# Patient Record
Sex: FEMALE | Race: BLACK OR AFRICAN AMERICAN | NOT HISPANIC OR LATINO
[De-identification: names, ages, dates, MRNs, and addresses within clinical notes are randomized per-mention and may not be internally consistent; named-entity substitution may affect disease eponyms.]

---

## 2017-01-09 ENCOUNTER — TRANSCRIPTION ENCOUNTER (OUTPATIENT)
Age: 42
End: 2017-01-09

## 2017-01-12 ENCOUNTER — APPOINTMENT (OUTPATIENT)
Dept: PLASTIC SURGERY | Facility: CLINIC | Age: 42
End: 2017-01-12

## 2017-01-12 VITALS
HEART RATE: 58 BPM | SYSTOLIC BLOOD PRESSURE: 112 MMHG | DIASTOLIC BLOOD PRESSURE: 71 MMHG | WEIGHT: 145 LBS | TEMPERATURE: 98.1 F | HEIGHT: 64 IN | BODY MASS INDEX: 24.75 KG/M2

## 2017-01-12 DIAGNOSIS — Z78.9 OTHER SPECIFIED HEALTH STATUS: ICD-10-CM

## 2017-01-12 DIAGNOSIS — Z87.891 PERSONAL HISTORY OF NICOTINE DEPENDENCE: ICD-10-CM

## 2017-01-12 DIAGNOSIS — Z83.2 FAMILY HISTORY OF DISEASES OF THE BLOOD AND BLOOD-FORMING ORGANS AND CERTAIN DISORDERS INVOLVING THE IMMUNE MECHANISM: ICD-10-CM

## 2017-01-12 DIAGNOSIS — F15.90 OTHER STIMULANT USE, UNSPECIFIED, UNCOMPLICATED: ICD-10-CM

## 2017-01-13 RX ORDER — MULTIVIT-MIN/IRON FUM/FOLIC AC 7.5 MG-4
TABLET ORAL
Refills: 0 | Status: ACTIVE | COMMUNITY

## 2017-01-19 ENCOUNTER — APPOINTMENT (OUTPATIENT)
Dept: INTERNAL MEDICINE | Facility: CLINIC | Age: 42
End: 2017-01-19

## 2017-01-19 ENCOUNTER — NON-APPOINTMENT (OUTPATIENT)
Age: 42
End: 2017-01-19

## 2017-01-19 ENCOUNTER — LABORATORY RESULT (OUTPATIENT)
Age: 42
End: 2017-01-19

## 2017-01-19 VITALS
DIASTOLIC BLOOD PRESSURE: 68 MMHG | SYSTOLIC BLOOD PRESSURE: 110 MMHG | BODY MASS INDEX: 22.77 KG/M2 | HEART RATE: 64 BPM | HEIGHT: 64.5 IN | WEIGHT: 135 LBS

## 2017-01-19 DIAGNOSIS — E61.1 IRON DEFICIENCY: ICD-10-CM

## 2017-01-19 DIAGNOSIS — G43.909 MIGRAINE, UNSPECIFIED, NOT INTRACTABLE, W/OUT STATUS MIGRAINOSUS: ICD-10-CM

## 2017-01-19 DIAGNOSIS — Z00.00 ENCOUNTER FOR GENERAL ADULT MEDICAL EXAMINATION W/OUT ABNORMAL FINDINGS: ICD-10-CM

## 2017-01-19 DIAGNOSIS — N92.0 EXCESSIVE AND FREQUENT MENSTRUATION WITH REGULAR CYCLE: ICD-10-CM

## 2017-01-24 PROBLEM — E61.1 IRON DEFICIENCY: Status: ACTIVE | Noted: 2017-01-24

## 2017-01-24 LAB
25(OH)D3 SERPL-MCNC: 17.8 NG/ML
ALBUMIN SERPL ELPH-MCNC: 4.2 G/DL
ALP BLD-CCNC: 37 U/L
ALT SERPL-CCNC: 13 U/L
ANION GAP SERPL CALC-SCNC: 16 MMOL/L
APTT BLD: 32.6 SEC
AST SERPL-CCNC: 15 U/L
BASOPHILS # BLD AUTO: 0 K/UL
BASOPHILS NFR BLD AUTO: 0 %
BILIRUB SERPL-MCNC: <0.2 MG/DL
BILIRUB UR QL STRIP: NEGATIVE
BUN SERPL-MCNC: 15 MG/DL
CALCIUM SERPL-MCNC: 9.4 MG/DL
CHLORIDE SERPL-SCNC: 106 MMOL/L
CHOLEST SERPL-MCNC: 162 MG/DL
CHOLEST/HDLC SERPL: 2.8 RATIO
CLARITY UR: CLEAR
CO2 SERPL-SCNC: 24 MMOL/L
COLLECTION METHOD: NORMAL
CREAT SERPL-MCNC: 0.74 MG/DL
EOSINOPHIL # BLD AUTO: 0.17 K/UL
EOSINOPHIL NFR BLD AUTO: 3.7 %
FERRITIN SERPL-MCNC: 29.9 NG/ML
FOLATE SERPL-MCNC: 8.6 NG/ML
GLUCOSE SERPL-MCNC: 89 MG/DL
GLUCOSE UR-MCNC: NEGATIVE
HBA1C MFR BLD HPLC: 5.1 %
HCG SERPL-MCNC: 1 MIU/ML
HCG UR QL: 0.2 EU/DL
HCT VFR BLD CALC: 31.2 %
HDLC SERPL-MCNC: 58 MG/DL
HGB A MFR BLD: 69.3 %
HGB A2 MFR BLD: 3.6 %
HGB BLD-MCNC: 10.6 G/DL
HGB C MFR BLD: 27.1 %
HGB F MFR BLD: 0 %
HGB FRACT BLD-IMP: NORMAL
HGB S BLD QL SOLY: NEGATIVE
HGB UR QL STRIP.AUTO: NORMAL
INR PPP: 1.02 RATIO
IRON SATN MFR SERPL: 21 %
IRON SERPL-MCNC: 57 UG/DL
KETONES UR-MCNC: NEGATIVE
LDLC SERPL CALC-MCNC: 99 MG/DL
LEUKOCYTE ESTERASE UR QL STRIP: NEGATIVE
LYMPHOCYTES # BLD AUTO: 2.58 K/UL
LYMPHOCYTES NFR BLD AUTO: 55.1 %
MAN DIFF?: NORMAL
MCHC RBC-ENTMCNC: 23.5 PG
MCHC RBC-ENTMCNC: 34 GM/DL
MCV RBC AUTO: 69 FL
MONOCYTES # BLD AUTO: 0.66 K/UL
MONOCYTES NFR BLD AUTO: 14 %
NEUTROPHILS # BLD AUTO: 1.14 K/UL
NEUTROPHILS NFR BLD AUTO: 24.3 %
NITRITE UR QL STRIP: NEGATIVE
PH UR STRIP: 6
PLATELET # BLD AUTO: 178 K/UL
POTASSIUM SERPL-SCNC: 4.3 MMOL/L
PROT SERPL-MCNC: 6.9 G/DL
PROT UR STRIP-MCNC: NEGATIVE
PT BLD: 11.5 SEC
RBC # BLD: 4.52 M/UL
RBC # FLD: 15.3 %
SAVE SPECIMEN: NORMAL
SODIUM SERPL-SCNC: 146 MMOL/L
SP GR UR STRIP: >=1.03
T3FREE SERPL-MCNC: 2.9 PG/ML
T4 SERPL-MCNC: 6.2 UG/DL
TIBC SERPL-MCNC: 272 UG/DL
TRIGL SERPL-MCNC: 25 MG/DL
TSH SERPL-ACNC: 0.43 UIU/ML
UIBC SERPL-MCNC: 215 UG/DL
WBC # FLD AUTO: 4.68 K/UL

## 2017-01-24 RX ORDER — FOLIC ACID 1 MG/1
1 TABLET ORAL DAILY
Qty: 90 | Refills: 3 | Status: ACTIVE | COMMUNITY
Start: 2017-01-19 | End: 1900-01-01

## 2017-01-24 RX ORDER — FERROUS GLUCONATE 324(38)MG
324 (38 FE) TABLET ORAL
Qty: 100 | Refills: 1 | Status: ACTIVE | COMMUNITY
Start: 2017-01-24 | End: 1900-01-01

## 2017-01-26 ENCOUNTER — APPOINTMENT (OUTPATIENT)
Dept: PLASTIC SURGERY | Facility: CLINIC | Age: 42
End: 2017-01-26

## 2017-01-27 ENCOUNTER — OUTPATIENT (OUTPATIENT)
Dept: OUTPATIENT SERVICES | Facility: HOSPITAL | Age: 42
LOS: 1 days | End: 2017-01-27

## 2017-01-27 VITALS
SYSTOLIC BLOOD PRESSURE: 104 MMHG | WEIGHT: 134.92 LBS | TEMPERATURE: 98 F | RESPIRATION RATE: 16 BRPM | HEART RATE: 88 BPM | DIASTOLIC BLOOD PRESSURE: 64 MMHG | HEIGHT: 64.5 IN

## 2017-01-27 DIAGNOSIS — Z01.818 ENCOUNTER FOR OTHER PREPROCEDURAL EXAMINATION: ICD-10-CM

## 2017-01-27 DIAGNOSIS — K00.9 DISORDER OF TOOTH DEVELOPMENT, UNSPECIFIED: Chronic | ICD-10-CM

## 2017-01-27 DIAGNOSIS — Z91.040 LATEX ALLERGY STATUS: ICD-10-CM

## 2017-01-27 DIAGNOSIS — S01.319A LACERATION WITHOUT FOREIGN BODY OF UNSPECIFIED EAR, INITIAL ENCOUNTER: ICD-10-CM

## 2017-01-27 DIAGNOSIS — Q17.9 CONGENITAL MALFORMATION OF EAR, UNSPECIFIED: ICD-10-CM

## 2017-01-27 LAB
BUN SERPL-MCNC: 11 MG/DL — SIGNIFICANT CHANGE UP (ref 7–23)
CALCIUM SERPL-MCNC: 9.3 MG/DL — SIGNIFICANT CHANGE UP (ref 8.4–10.5)
CHLORIDE SERPL-SCNC: 101 MMOL/L — SIGNIFICANT CHANGE UP (ref 98–107)
CO2 SERPL-SCNC: 24 MMOL/L — SIGNIFICANT CHANGE UP (ref 22–31)
CREAT SERPL-MCNC: 0.71 MG/DL — SIGNIFICANT CHANGE UP (ref 0.5–1.3)
GLUCOSE SERPL-MCNC: 79 MG/DL — SIGNIFICANT CHANGE UP (ref 70–99)
HCT VFR BLD CALC: 31.6 % — LOW (ref 34.5–45)
HGB BLD-MCNC: 11 G/DL — LOW (ref 11.5–15.5)
HIV1 AG SER QL: SIGNIFICANT CHANGE UP
HIV1+2 AB SPEC QL: SIGNIFICANT CHANGE UP
MCHC RBC-ENTMCNC: 23.7 PG — LOW (ref 27–34)
MCHC RBC-ENTMCNC: 34.8 % — SIGNIFICANT CHANGE UP (ref 32–36)
MCV RBC AUTO: 68 FL — LOW (ref 80–100)
PLATELET # BLD AUTO: 197 K/UL — SIGNIFICANT CHANGE UP (ref 150–400)
PMV BLD: 11.2 FL — SIGNIFICANT CHANGE UP (ref 7–13)
POTASSIUM SERPL-MCNC: 4.1 MMOL/L — SIGNIFICANT CHANGE UP (ref 3.5–5.3)
POTASSIUM SERPL-SCNC: 4.1 MMOL/L — SIGNIFICANT CHANGE UP (ref 3.5–5.3)
RBC # BLD: 4.65 M/UL — SIGNIFICANT CHANGE UP (ref 3.8–5.2)
RBC # FLD: 15.5 % — HIGH (ref 10.3–14.5)
SODIUM SERPL-SCNC: 137 MMOL/L — SIGNIFICANT CHANGE UP (ref 135–145)
WBC # BLD: 5.52 K/UL — SIGNIFICANT CHANGE UP (ref 3.8–10.5)
WBC # FLD AUTO: 5.52 K/UL — SIGNIFICANT CHANGE UP (ref 3.8–10.5)

## 2017-01-27 NOTE — H&P PST ADULT - NSANTHOSAYNRD_GEN_A_CORE
never tested/No. PETERSON screening performed.  STOP BANG Legend: 0-2 = LOW Risk; 3-4 = INTERMEDIATE Risk; 5-8 = HIGH Risk

## 2017-01-27 NOTE — H&P PST ADULT - HISTORY OF PRESENT ILLNESS
41 yrs old female with h/o torn left ear lobe 41 yrs old female with h/o torn left ear lobe due to wearing heavy ear rings presents for preop eval to have left ear reconstruction with local flap, bilat silicone breast augmentation on 2/1/17. Pt stated that her left ear lobe was torn for many yrs.

## 2017-01-27 NOTE — H&P PST ADULT - NEGATIVE NEUROLOGICAL SYMPTOMS
no headache/no difficulty walking/no transient paralysis/no vertigo/no loss of sensation/no weakness

## 2017-01-27 NOTE — H&P PST ADULT - ASSESSMENT
41 yrs old female with h/o torn left ear lobe due to wearing heavy ear rings presents for preop eval to have left ear reconstruction with local flap, bilat silicone breast augmentation on 2/1/17. Pt stated that her left ear lobe was torn for many yrs.

## 2017-01-27 NOTE — H&P PST ADULT - NEGATIVE ENMT SYMPTOMS
no tinnitus/no recurrent cold sores/no nasal discharge/no ear pain/no abnormal taste sensation/no gum bleeding/no nasal congestion/no sinus symptoms/no throat pain/left ear lobe laceration due to wearing heavy ear rings/no hearing difficulty/no vertigo/no nose bleeds/no dysphagia/no dry mouth/no post-nasal discharge/no nasal obstruction

## 2017-01-27 NOTE — H&P PST ADULT - PROBLEM SELECTOR PLAN 1
scheduled for left ear reconstruction with local flap, bilat silicone breast augmentation on 2/1/17.   labs pending   preop instructions provided to pt

## 2017-02-01 ENCOUNTER — OUTPATIENT (OUTPATIENT)
Dept: OUTPATIENT SERVICES | Facility: HOSPITAL | Age: 42
LOS: 1 days | Discharge: HOME | End: 2017-02-01
Payer: COMMERCIAL

## 2017-02-01 VITALS
RESPIRATION RATE: 15 BRPM | DIASTOLIC BLOOD PRESSURE: 67 MMHG | OXYGEN SATURATION: 100 % | TEMPERATURE: 98 F | SYSTOLIC BLOOD PRESSURE: 128 MMHG | HEART RATE: 78 BPM

## 2017-02-01 VITALS
HEIGHT: 64 IN | HEART RATE: 65 BPM | SYSTOLIC BLOOD PRESSURE: 102 MMHG | DIASTOLIC BLOOD PRESSURE: 49 MMHG | OXYGEN SATURATION: 100 % | RESPIRATION RATE: 16 BRPM | TEMPERATURE: 98 F | WEIGHT: 134.92 LBS

## 2017-02-01 DIAGNOSIS — Q17.9 CONGENITAL MALFORMATION OF EAR, UNSPECIFIED: ICD-10-CM

## 2017-02-01 DIAGNOSIS — K00.9 DISORDER OF TOOTH DEVELOPMENT, UNSPECIFIED: Chronic | ICD-10-CM

## 2017-02-01 PROBLEM — D64.9 ANEMIA, UNSPECIFIED: Chronic | Status: ACTIVE | Noted: 2017-01-27

## 2017-02-01 PROBLEM — S01.319A: Chronic | Status: ACTIVE | Noted: 2017-01-27

## 2017-02-01 PROCEDURE — 19325 BREAST AUGMENTATION W/IMPLT: CPT | Mod: 50

## 2017-02-01 PROCEDURE — 19318 BREAST REDUCTION: CPT | Mod: 50,59

## 2017-02-01 PROCEDURE — 19316 MASTOPEXY: CPT | Mod: 50

## 2017-02-01 PROCEDURE — 14060 TIS TRNFR E/N/E/L 10 SQ CM/<: CPT

## 2017-02-01 PROCEDURE — 69300 REVISE EXTERNAL EAR: CPT

## 2017-02-01 RX ORDER — SODIUM CHLORIDE 9 MG/ML
1000 INJECTION, SOLUTION INTRAVENOUS
Qty: 0 | Refills: 0 | Status: DISCONTINUED | OUTPATIENT
Start: 2017-02-01 | End: 2017-02-02

## 2017-02-01 RX ORDER — FENTANYL CITRATE 50 UG/ML
50 INJECTION INTRAVENOUS
Qty: 0 | Refills: 0 | Status: DISCONTINUED | OUTPATIENT
Start: 2017-02-01 | End: 2017-02-02

## 2017-02-01 RX ORDER — FENTANYL CITRATE 50 UG/ML
25 INJECTION INTRAVENOUS
Qty: 0 | Refills: 0 | Status: DISCONTINUED | OUTPATIENT
Start: 2017-02-01 | End: 2017-02-02

## 2017-02-01 RX ORDER — ONDANSETRON 8 MG/1
4 TABLET, FILM COATED ORAL ONCE
Qty: 0 | Refills: 0 | Status: COMPLETED | OUTPATIENT
Start: 2017-02-01 | End: 2017-02-01

## 2017-02-01 RX ADMIN — FENTANYL CITRATE 50 MICROGRAM(S): 50 INJECTION INTRAVENOUS at 16:07

## 2017-02-01 RX ADMIN — SODIUM CHLORIDE 100 MILLILITER(S): 9 INJECTION, SOLUTION INTRAVENOUS at 16:06

## 2017-02-01 RX ADMIN — FENTANYL CITRATE 50 MICROGRAM(S): 50 INJECTION INTRAVENOUS at 16:05

## 2017-02-01 RX ADMIN — FENTANYL CITRATE 50 MICROGRAM(S): 50 INJECTION INTRAVENOUS at 15:54

## 2017-02-01 RX ADMIN — ONDANSETRON 4 MILLIGRAM(S): 8 TABLET, FILM COATED ORAL at 16:05

## 2017-02-01 RX ADMIN — FENTANYL CITRATE 50 MICROGRAM(S): 50 INJECTION INTRAVENOUS at 16:47

## 2017-02-01 NOTE — ASU DISCHARGE PLAN (ADULT/PEDIATRIC). - MEDICATION SUMMARY - MEDICATIONS TO TAKE
I will START or STAY ON the medications listed below when I get home from the hospital:    Percocet 5/325 325 mg-5 mg oral tablet  -- 1 tab(s) by mouth every 4 hours MDD:6 tabs  -- Caution federal law prohibits the transfer of this drug to any person other  than the person for whom it was prescribed.  May cause drowsiness.  Alcohol may intensify this effect.  Use care when operating dangerous machinery.  This prescription cannot be refilled.  This product contains acetaminophen.  Do not use  with any other product containing acetaminophen to prevent possible liver damage.  Using more of this medication than prescribed may cause serious breathing problems.    -- Indication: For pain    clindamycin 300 mg oral capsule  -- 1 cap(s) by mouth every 6 hours  -- Finish all this medication unless otherwise directed by prescriber.  Medication should be taken with plenty of water.    -- Indication: For antibiotic    multivitamin  -- 1 tab(s) by mouth once a last dose on 2/2/17  -- Indication: For vitamin    folic acid 1 mg oral tablet  -- 1 tab(s) by mouth once a day in am  -- Indication: For vitamin

## 2017-02-01 NOTE — ASU DISCHARGE PLAN (ADULT/PEDIATRIC). - NOTIFY
Fever greater than 101/Persistent Nausea and Vomiting Fever greater than 101/Pain not relieved by Medications/Bleeding that does not stop/Persistent Nausea and Vomiting

## 2017-02-01 NOTE — ASU DISCHARGE PLAN (ADULT/PEDIATRIC). - NURSING INSTRUCTIONS
When taking pain meds - take with food and know it may cause constipation. To prevent constipation increase fluids and fiber in diet. - Do NOT drive while on narcotics. You were given IV Tylenol for pain management.  Please DO NOT take tylenol or PERCOCET for the next 6-8 hours (until 6:30PM ). Please do not exceed 3000mg of tylenol in 24hours.

## 2017-02-02 ENCOUNTER — TRANSCRIPTION ENCOUNTER (OUTPATIENT)
Age: 42
End: 2017-02-02

## 2017-02-07 ENCOUNTER — APPOINTMENT (OUTPATIENT)
Dept: PLASTIC SURGERY | Facility: CLINIC | Age: 42
End: 2017-02-07

## 2017-02-21 ENCOUNTER — APPOINTMENT (OUTPATIENT)
Dept: PLASTIC SURGERY | Facility: CLINIC | Age: 42
End: 2017-02-21

## 2017-02-28 ENCOUNTER — APPOINTMENT (OUTPATIENT)
Dept: PLASTIC SURGERY | Facility: CLINIC | Age: 42
End: 2017-02-28

## 2017-03-01 RX ORDER — NITROFURANTOIN MACROCRYSTALS 100 MG/1
100 CAPSULE ORAL
Qty: 20 | Refills: 0 | Status: ACTIVE | COMMUNITY
Start: 2016-08-31

## 2017-03-14 ENCOUNTER — APPOINTMENT (OUTPATIENT)
Dept: PLASTIC SURGERY | Facility: CLINIC | Age: 42
End: 2017-03-14

## 2017-03-16 ENCOUNTER — APPOINTMENT (OUTPATIENT)
Dept: PLASTIC SURGERY | Facility: CLINIC | Age: 42
End: 2017-03-16

## 2017-04-11 ENCOUNTER — APPOINTMENT (OUTPATIENT)
Dept: PLASTIC SURGERY | Facility: CLINIC | Age: 42
End: 2017-04-11

## 2017-05-09 ENCOUNTER — APPOINTMENT (OUTPATIENT)
Dept: PLASTIC SURGERY | Facility: CLINIC | Age: 42
End: 2017-05-09

## 2017-05-23 ENCOUNTER — APPOINTMENT (OUTPATIENT)
Dept: DERMATOLOGY | Facility: CLINIC | Age: 42
End: 2017-05-23

## 2017-05-23 VITALS — SYSTOLIC BLOOD PRESSURE: 100 MMHG | DIASTOLIC BLOOD PRESSURE: 60 MMHG

## 2017-05-23 DIAGNOSIS — L81.0 POSTINFLAMMATORY HYPERPIGMENTATION: ICD-10-CM

## 2017-05-23 RX ORDER — FLUOCINOLONE ACETONIDE, HYDROQUINONE, AND TRETINOIN .1; 40; .5 MG/G; MG/G; MG/G
0.01-4-0.05 CREAM TOPICAL
Qty: 1 | Refills: 1 | Status: ACTIVE | COMMUNITY
Start: 2017-05-23 | End: 1900-01-01

## 2017-05-25 ENCOUNTER — OUTPATIENT (OUTPATIENT)
Dept: OUTPATIENT SERVICES | Facility: HOSPITAL | Age: 42
LOS: 1 days | End: 2017-05-25
Payer: COMMERCIAL

## 2017-05-25 VITALS
OXYGEN SATURATION: 100 % | HEIGHT: 64 IN | SYSTOLIC BLOOD PRESSURE: 106 MMHG | HEART RATE: 70 BPM | WEIGHT: 145.06 LBS | DIASTOLIC BLOOD PRESSURE: 69 MMHG | TEMPERATURE: 98 F

## 2017-05-25 DIAGNOSIS — Z98.82 BREAST IMPLANT STATUS: Chronic | ICD-10-CM

## 2017-05-25 DIAGNOSIS — Z01.818 ENCOUNTER FOR OTHER PREPROCEDURAL EXAMINATION: ICD-10-CM

## 2017-05-25 DIAGNOSIS — N65.1 DISPROPORTION OF RECONSTRUCTED BREAST: ICD-10-CM

## 2017-05-25 DIAGNOSIS — L91.0 HYPERTROPHIC SCAR: ICD-10-CM

## 2017-05-25 DIAGNOSIS — Z98.51 TUBAL LIGATION STATUS: Chronic | ICD-10-CM

## 2017-05-25 DIAGNOSIS — K00.9 DISORDER OF TOOTH DEVELOPMENT, UNSPECIFIED: Chronic | ICD-10-CM

## 2017-05-25 LAB
HCT VFR BLD CALC: 32.3 % — LOW (ref 34.5–45)
HGB BLD-MCNC: 11 G/DL — LOW (ref 11.5–15.5)
INR BLD: 0.89 RATIO — SIGNIFICANT CHANGE UP (ref 0.88–1.16)
MCHC RBC-ENTMCNC: 23 PG — LOW (ref 27–34)
MCHC RBC-ENTMCNC: 34.1 GM/DL — SIGNIFICANT CHANGE UP (ref 32–36)
MCV RBC AUTO: 67.4 FL — LOW (ref 80–100)
PLATELET # BLD AUTO: 220 K/UL — SIGNIFICANT CHANGE UP (ref 150–400)
PROTHROM AB SERPL-ACNC: 10 SEC — SIGNIFICANT CHANGE UP (ref 10–13.1)
RBC # BLD: 4.79 M/UL — SIGNIFICANT CHANGE UP (ref 3.8–5.2)
RBC # FLD: 15.8 % — HIGH (ref 10.3–14.5)
WBC # BLD: 4.97 K/UL — SIGNIFICANT CHANGE UP (ref 3.8–10.5)
WBC # FLD AUTO: 4.97 K/UL — SIGNIFICANT CHANGE UP (ref 3.8–10.5)

## 2017-05-25 PROCEDURE — 85027 COMPLETE CBC AUTOMATED: CPT

## 2017-05-25 PROCEDURE — 85610 PROTHROMBIN TIME: CPT

## 2017-05-25 RX ORDER — CELECOXIB 200 MG/1
200 CAPSULE ORAL ONCE
Qty: 0 | Refills: 0 | Status: COMPLETED | OUTPATIENT
Start: 2017-05-31 | End: 2017-05-31

## 2017-05-25 RX ORDER — SODIUM CHLORIDE 9 MG/ML
3 INJECTION INTRAMUSCULAR; INTRAVENOUS; SUBCUTANEOUS EVERY 8 HOURS
Qty: 0 | Refills: 0 | Status: DISCONTINUED | OUTPATIENT
Start: 2017-05-31 | End: 2017-06-15

## 2017-05-25 RX ORDER — ACETAMINOPHEN 500 MG
975 TABLET ORAL ONCE
Qty: 0 | Refills: 0 | Status: COMPLETED | OUTPATIENT
Start: 2017-05-31 | End: 2017-05-31

## 2017-05-25 RX ORDER — LIDOCAINE HCL 20 MG/ML
0.2 VIAL (ML) INJECTION ONCE
Qty: 0 | Refills: 0 | Status: DISCONTINUED | OUTPATIENT
Start: 2017-05-31 | End: 2017-06-15

## 2017-05-25 RX ORDER — APREPITANT 80 MG/1
40 CAPSULE ORAL ONCE
Qty: 0 | Refills: 0 | Status: COMPLETED | OUTPATIENT
Start: 2017-05-31 | End: 2017-05-31

## 2017-05-25 NOTE — H&P PST ADULT - NSANTHOSAYNRD_GEN_A_CORE
No. PETERSON screening performed.  STOP BANG Legend: 0-2 = LOW Risk; 3-4 = INTERMEDIATE Risk; 5-8 = HIGH Risk

## 2017-05-25 NOTE — H&P PST ADULT - VISION (WITH CORRECTIVE LENSES IF THE PATIENT USUALLY WEARS THEM):
Pt wears glasses/Normal vision: sees adequately in most situations; can see medication labels, newsprint

## 2017-05-25 NOTE — H&P PST ADULT - HISTORY OF PRESENT ILLNESS
Pt. is a 41 y.o. BF who presents s/p Bilateral Breast Augmentation in January 2017. Pt. states implants are assymetrical and " the left one didn't drop." Pt is scheduled for left breast revision on 5/31/17 with Dr. Bernal.

## 2017-05-25 NOTE — H&P PST ADULT - PMH
Anemia    Breast asymmetry following reconstructive surgery    Ear lobe laceration  left  Hemoglobin C trait  Pt states she is asymptomatic and treats deficiency with daily folic acid

## 2017-05-25 NOTE — H&P PST ADULT - PSH
Dental anomaly  all four wisdom teeth removed 1996  Status post breast augmentation    Status post tubal ligation

## 2017-05-31 ENCOUNTER — OUTPATIENT (OUTPATIENT)
Dept: OUTPATIENT SERVICES | Facility: HOSPITAL | Age: 42
LOS: 1 days | End: 2017-05-31
Payer: COMMERCIAL

## 2017-05-31 VITALS
TEMPERATURE: 98 F | RESPIRATION RATE: 18 BRPM | WEIGHT: 145.06 LBS | SYSTOLIC BLOOD PRESSURE: 89 MMHG | OXYGEN SATURATION: 100 % | DIASTOLIC BLOOD PRESSURE: 60 MMHG | HEIGHT: 64 IN | HEART RATE: 68 BPM

## 2017-05-31 VITALS
RESPIRATION RATE: 18 BRPM | OXYGEN SATURATION: 100 % | DIASTOLIC BLOOD PRESSURE: 72 MMHG | HEART RATE: 71 BPM | TEMPERATURE: 99 F | SYSTOLIC BLOOD PRESSURE: 110 MMHG

## 2017-05-31 DIAGNOSIS — Z98.82 BREAST IMPLANT STATUS: Chronic | ICD-10-CM

## 2017-05-31 DIAGNOSIS — K00.9 DISORDER OF TOOTH DEVELOPMENT, UNSPECIFIED: Chronic | ICD-10-CM

## 2017-05-31 DIAGNOSIS — L91.0 HYPERTROPHIC SCAR: ICD-10-CM

## 2017-05-31 DIAGNOSIS — Z98.51 TUBAL LIGATION STATUS: Chronic | ICD-10-CM

## 2017-05-31 PROCEDURE — 19380 REVJ RECONSTRUCTED BREAST: CPT | Mod: LT

## 2017-05-31 PROCEDURE — 13101 CMPLX RPR TRUNK 2.6-7.5 CM: CPT

## 2017-05-31 RX ORDER — FOLIC ACID 0.8 MG
1 TABLET ORAL
Qty: 0 | Refills: 0 | COMMUNITY

## 2017-05-31 RX ORDER — ONDANSETRON 8 MG/1
4 TABLET, FILM COATED ORAL ONCE
Qty: 0 | Refills: 0 | Status: COMPLETED | OUTPATIENT
Start: 2017-05-31 | End: 2017-05-31

## 2017-05-31 RX ORDER — SODIUM CHLORIDE 9 MG/ML
1000 INJECTION, SOLUTION INTRAVENOUS
Qty: 0 | Refills: 0 | Status: DISCONTINUED | OUTPATIENT
Start: 2017-05-31 | End: 2017-06-15

## 2017-05-31 RX ORDER — OXYCODONE HYDROCHLORIDE 5 MG/1
5 TABLET ORAL ONCE
Qty: 0 | Refills: 0 | Status: DISCONTINUED | OUTPATIENT
Start: 2017-05-31 | End: 2017-05-31

## 2017-05-31 RX ORDER — CELECOXIB 200 MG/1
200 CAPSULE ORAL ONCE
Qty: 0 | Refills: 0 | Status: DISCONTINUED | OUTPATIENT
Start: 2017-05-31 | End: 2017-06-15

## 2017-05-31 RX ORDER — NITROFURANTOIN MACROCRYSTAL 50 MG
1 CAPSULE ORAL
Qty: 0 | Refills: 0 | COMMUNITY

## 2017-05-31 RX ORDER — FAMOTIDINE 10 MG/ML
0 INJECTION INTRAVENOUS
Qty: 0 | Refills: 0 | COMMUNITY

## 2017-05-31 RX ADMIN — APREPITANT 40 MILLIGRAM(S): 80 CAPSULE ORAL at 13:22

## 2017-05-31 RX ADMIN — CELECOXIB 200 MILLIGRAM(S): 200 CAPSULE ORAL at 13:21

## 2017-05-31 RX ADMIN — Medication 975 MILLIGRAM(S): at 13:21

## 2017-05-31 RX ADMIN — ONDANSETRON 4 MILLIGRAM(S): 8 TABLET, FILM COATED ORAL at 17:36

## 2017-05-31 RX ADMIN — OXYCODONE HYDROCHLORIDE 5 MILLIGRAM(S): 5 TABLET ORAL at 17:35

## 2017-05-31 NOTE — ASU PREOP CHECKLIST - PATIENT'S PERSONAL PROPERTY REMOVED
piercing posterior neck patient unable to remove will ask surgeon to remove piercing posterior neck patient unable to remove will ask surgeon to remove per patient

## 2017-05-31 NOTE — ASU PATIENT PROFILE, ADULT - VISION (WITH CORRECTIVE LENSES IF THE PATIENT USUALLY WEARS THEM):
Normal vision: sees adequately in most situations; can see medication labels, newsprint/Pt wears glasses

## 2017-05-31 NOTE — ASU DISCHARGE PLAN (ADULT/PEDIATRIC). - NOTIFY
Inability to Tolerate Liquids or Foods/Persistent Nausea and Vomiting/Pain not relieved by Medications/Fever greater than 101 Pain not relieved by Medications/Swelling that continues/Fever greater than 101/Inability to Tolerate Liquids or Foods/Unable to Urinate/Persistent Nausea and Vomiting

## 2017-05-31 NOTE — ASU DISCHARGE PLAN (ADULT/PEDIATRIC). - FOLLOWUP APPOINTMENT CLINIC/PHYSICIAN
Please follow up with Dr. Bernal within x1 week after discharge from the hospital. You may call [phone] to schedule an appointment.

## 2017-05-31 NOTE — ASU DISCHARGE PLAN (ADULT/PEDIATRIC). - DRIVING
No/Please do not drive until your pain is well controlled, and you do not need to take pain medications.

## 2017-05-31 NOTE — BRIEF OPERATIVE NOTE - OPERATION/FINDINGS
Preoperative Diagnosis: h/o bilateral breast reconstruction; breast asymmetry  Procedure: left breast revision of reconstruction  Postoperative Diagnosis: h/o bilateral breast reconstruction; breast asymmetry

## 2017-06-01 ENCOUNTER — TRANSCRIPTION ENCOUNTER (OUTPATIENT)
Age: 42
End: 2017-06-01

## 2017-06-05 RX ORDER — SULFAMETHOXAZOLE AND TRIMETHOPRIM 800; 160 MG/1; MG/1
800-160 TABLET ORAL TWICE DAILY
Qty: 10 | Refills: 0 | Status: ACTIVE | COMMUNITY
Start: 2017-06-05 | End: 1900-01-01

## 2017-06-06 ENCOUNTER — APPOINTMENT (OUTPATIENT)
Dept: PLASTIC SURGERY | Facility: CLINIC | Age: 42
End: 2017-06-06

## 2017-06-06 DIAGNOSIS — Z41.1 ENCOUNTER FOR COSMETIC SURGERY: ICD-10-CM

## 2017-06-20 ENCOUNTER — APPOINTMENT (OUTPATIENT)
Dept: PLASTIC SURGERY | Facility: CLINIC | Age: 42
End: 2017-06-20

## 2017-06-20 DIAGNOSIS — H61.119 ACQUIRED DEFORMITY OF PINNA, UNSPECIFIED EAR: ICD-10-CM

## 2017-06-28 PROBLEM — H61.119 ACQUIRED EAR DEFORMITY: Status: ACTIVE | Noted: 2017-01-13

## 2017-09-05 ENCOUNTER — APPOINTMENT (OUTPATIENT)
Dept: PLASTIC SURGERY | Facility: CLINIC | Age: 42
End: 2017-09-05
Payer: COMMERCIAL

## 2017-09-05 DIAGNOSIS — N63 UNSPECIFIED LUMP IN BREAST: ICD-10-CM

## 2017-09-05 PROCEDURE — 99213 OFFICE O/P EST LOW 20 MIN: CPT

## 2017-09-05 RX ORDER — DAPSONE 50 MG/G
5 GEL TOPICAL
Qty: 60 | Refills: 0 | Status: ACTIVE | COMMUNITY
Start: 2017-04-12

## 2017-09-05 RX ORDER — OXYCODONE AND ACETAMINOPHEN 5; 325 MG/1; MG/1
5-325 TABLET ORAL
Qty: 30 | Refills: 0 | Status: COMPLETED | COMMUNITY
Start: 2017-05-31

## 2018-05-15 ENCOUNTER — APPOINTMENT (OUTPATIENT)
Dept: PLASTIC SURGERY | Facility: CLINIC | Age: 43
End: 2018-05-15
Payer: COMMERCIAL

## 2018-05-15 DIAGNOSIS — L91.0 HYPERTROPHIC SCAR: ICD-10-CM

## 2018-05-15 PROCEDURE — 99213 OFFICE O/P EST LOW 20 MIN: CPT

## 2018-05-15 PROCEDURE — 99199 UNLISTED SPECIAL SVC PX/RPRT: CPT | Mod: NC

## 2018-07-16 PROBLEM — D58.2 OTHER HEMOGLOBINOPATHIES: Chronic | Status: ACTIVE | Noted: 2017-05-25

## 2018-07-26 NOTE — H&P PST ADULT - VASCULAR
[Patient Intake Form Reviewed] : Patient intake form was reviewed [Frequency] : frequency [Dizziness] : dizziness [Negative] : Integumentary [Suicidal] : not suicidal [Easy Bleeding] : no easy bleeding [de-identified] : vertigo Equal and normal pulses (carotid, femoral, dorsalis pedis)

## 2018-10-09 ENCOUNTER — RESULT REVIEW (OUTPATIENT)
Age: 43
End: 2018-10-09

## 2018-11-13 ENCOUNTER — APPOINTMENT (OUTPATIENT)
Dept: PLASTIC SURGERY | Facility: CLINIC | Age: 43
End: 2018-11-13

## 2019-01-02 ENCOUNTER — TRANSCRIPTION ENCOUNTER (OUTPATIENT)
Age: 44
End: 2019-01-02

## 2019-03-08 NOTE — H&P PST ADULT - BLOOD TRANSFUSION, PREVIOUS, PROFILE
Ms. Jeannie Blank called stating she was having vaginal discharge/bleeding. She stated her ovaries were causing her pain too. I made an appointment for Tuesday with Dr. Chidi Watson. (She prefers a female physician). After consulting with Dr. Pushpa Virgen about the bleeding, he recommended she see someone as soon as possible and not wait until next week. Ms. Jeannie Blank is going to an urgent care to get checked out & will follow back up. no

## 2019-05-24 ENCOUNTER — TRANSCRIPTION ENCOUNTER (OUTPATIENT)
Age: 44
End: 2019-05-24

## 2021-12-13 ENCOUNTER — OUTPATIENT (OUTPATIENT)
Dept: OUTPATIENT SERVICES | Facility: HOSPITAL | Age: 46
LOS: 1 days | End: 2021-12-13
Payer: COMMERCIAL

## 2021-12-13 VITALS
WEIGHT: 141.98 LBS | HEART RATE: 71 BPM | TEMPERATURE: 99 F | DIASTOLIC BLOOD PRESSURE: 76 MMHG | OXYGEN SATURATION: 99 % | RESPIRATION RATE: 18 BRPM | SYSTOLIC BLOOD PRESSURE: 111 MMHG | HEIGHT: 64 IN

## 2021-12-13 DIAGNOSIS — Z98.51 TUBAL LIGATION STATUS: Chronic | ICD-10-CM

## 2021-12-13 DIAGNOSIS — M47.816 SPONDYLOSIS WITHOUT MYELOPATHY OR RADICULOPATHY, LUMBAR REGION: ICD-10-CM

## 2021-12-13 DIAGNOSIS — K00.9 DISORDER OF TOOTH DEVELOPMENT, UNSPECIFIED: Chronic | ICD-10-CM

## 2021-12-13 DIAGNOSIS — Z29.9 ENCOUNTER FOR PROPHYLACTIC MEASURES, UNSPECIFIED: ICD-10-CM

## 2021-12-13 DIAGNOSIS — Z98.82 BREAST IMPLANT STATUS: Chronic | ICD-10-CM

## 2021-12-13 PROBLEM — Z00.00 ENCOUNTER FOR PREVENTIVE HEALTH EXAMINATION: Status: ACTIVE | Noted: 2021-12-13

## 2021-12-13 LAB
ANION GAP SERPL CALC-SCNC: 11 MMOL/L — SIGNIFICANT CHANGE UP (ref 5–17)
BLD GP AB SCN SERPL QL: NEGATIVE — SIGNIFICANT CHANGE UP
BUN SERPL-MCNC: 19 MG/DL — SIGNIFICANT CHANGE UP (ref 7–23)
CALCIUM SERPL-MCNC: 9.5 MG/DL — SIGNIFICANT CHANGE UP (ref 8.4–10.5)
CHLORIDE SERPL-SCNC: 104 MMOL/L — SIGNIFICANT CHANGE UP (ref 96–108)
CO2 SERPL-SCNC: 26 MMOL/L — SIGNIFICANT CHANGE UP (ref 22–31)
CREAT SERPL-MCNC: 0.66 MG/DL — SIGNIFICANT CHANGE UP (ref 0.5–1.3)
GLUCOSE SERPL-MCNC: 111 MG/DL — HIGH (ref 70–99)
HCT VFR BLD CALC: 34 % — LOW (ref 34.5–45)
HGB BLD-MCNC: 11.5 G/DL — SIGNIFICANT CHANGE UP (ref 11.5–15.5)
MCHC RBC-ENTMCNC: 23.3 PG — LOW (ref 27–34)
MCHC RBC-ENTMCNC: 33.8 GM/DL — SIGNIFICANT CHANGE UP (ref 32–36)
MCV RBC AUTO: 69 FL — LOW (ref 80–100)
MRSA PCR RESULT.: SIGNIFICANT CHANGE UP
NRBC # BLD: 0 /100 WBCS — SIGNIFICANT CHANGE UP (ref 0–0)
PLATELET # BLD AUTO: 203 K/UL — SIGNIFICANT CHANGE UP (ref 150–400)
POTASSIUM SERPL-MCNC: 5.3 MMOL/L — SIGNIFICANT CHANGE UP (ref 3.5–5.3)
POTASSIUM SERPL-SCNC: 5.3 MMOL/L — SIGNIFICANT CHANGE UP (ref 3.5–5.3)
RBC # BLD: 4.93 M/UL — SIGNIFICANT CHANGE UP (ref 3.8–5.2)
RBC # FLD: 15.1 % — HIGH (ref 10.3–14.5)
RH IG SCN BLD-IMP: POSITIVE — SIGNIFICANT CHANGE UP
S AUREUS DNA NOSE QL NAA+PROBE: SIGNIFICANT CHANGE UP
SODIUM SERPL-SCNC: 141 MMOL/L — SIGNIFICANT CHANGE UP (ref 135–145)
WBC # BLD: 4.68 K/UL — SIGNIFICANT CHANGE UP (ref 3.8–10.5)
WBC # FLD AUTO: 4.68 K/UL — SIGNIFICANT CHANGE UP (ref 3.8–10.5)

## 2021-12-13 RX ORDER — VANCOMYCIN HCL 1 G
1000 VIAL (EA) INTRAVENOUS ONCE
Refills: 0 | Status: DISCONTINUED | OUTPATIENT
Start: 2022-01-10 | End: 2022-01-11

## 2021-12-13 RX ORDER — LIDOCAINE HCL 20 MG/ML
0.2 VIAL (ML) INJECTION ONCE
Refills: 0 | Status: DISCONTINUED | OUTPATIENT
Start: 2022-01-10 | End: 2022-01-10

## 2021-12-13 RX ORDER — SODIUM CHLORIDE 9 MG/ML
3 INJECTION INTRAMUSCULAR; INTRAVENOUS; SUBCUTANEOUS EVERY 8 HOURS
Refills: 0 | Status: DISCONTINUED | OUTPATIENT
Start: 2022-01-10 | End: 2022-01-10

## 2021-12-13 RX ORDER — CHLORHEXIDINE GLUCONATE 213 G/1000ML
1 SOLUTION TOPICAL ONCE
Refills: 0 | Status: DISCONTINUED | OUTPATIENT
Start: 2022-01-10 | End: 2022-01-10

## 2021-12-13 NOTE — H&P PST ADULT - PROBLEM SELECTOR PLAN 1
-Left L4- L5  Facectomy, Decompression Laminectomy  l4-  5 ,l5 s1 lumbar posterior interbody Fusion on 1/10/22.  -Pre- Op Instructions Discussed   -labs sent   - covid test 1/7/21

## 2021-12-13 NOTE — H&P PST ADULT - HISTORY OF PRESENT ILLNESS
47 y/o female with chronic back pain  s/p multiple conservative management with no good improved followed by neurosurgery. Presents to PST for scheduled Left L4- L5   Facectomy, Decompression Laminectomy  l4-  5 ,l5 s1 lumbar posterior interbody Fusion on 1/10/22.    Covid test 1/7/21

## 2021-12-13 NOTE — H&P PST ADULT - NSICDXPASTSURGICALHX_GEN_ALL_CORE_FT
PAST SURGICAL HISTORY:  H/O breast implant 2017    H/O tubal ligation 2004    Normal vaginal delivery x2

## 2021-12-13 NOTE — H&P PST ADULT - PAIN, CHRONIC: FACTORS THAT AGGRAVATE, PROFILE
O-L Flap Text: The defect edges were debeveled with a #15 scalpel blade.  Given the location of the defect, shape of the defect and the proximity to free margins an O-L flap was deemed most appropriate.  Using a sterile surgical marker, an appropriate advancement flap was drawn incorporating the defect and placing the expected incisions within the relaxed skin tension lines where possible.    The area thus outlined was incised deep to adipose tissue with a #15 scalpel blade.  The skin margins were undermined to an appropriate distance in all directions utilizing iris scissors. movement

## 2021-12-13 NOTE — H&P PST ADULT - ASSESSMENT
CAPRINI SCORE [CLOT updated 18]    AGE RELATED RISK FACTORS                                                       MOBILITY RELATED FACTORS  [x ] Age 41-60 years                                            (1 Point)                    [ ] Bed rest                                                        (1 Point)  [ ] Age: 61-74 years                                           (2 Points)                  [ ] Plaster cast                                                   (2 Points)  [ ] Age= 75 years                                              (3 Points)                    [ ] Bed bound for more than 72 hours                 (2 Points)    DISEASE RELATED RISK FACTORS                                               GENDER SPECIFIC FACTORS  [ ] Edema in the lower extremities                       (1 Point)              [ ] Pregnancy                                                     (1 Point)  [ ] Varicose veins                                               (1 Point)                     [ ] Post-partum < 6 weeks                                   (1 Point)             [x ] BMI > 25 Kg/m2                                            (1 Point)                     [ ] Hormonal therapy  or oral contraception          (1 Point)                 [ ] Sepsis (in the previous month)                        (1 Point)               [ ] History of pregnancy complications                 (1 point)  [ ] Pneumonia or serious lung disease                                               [ ] Unexplained or recurrent                     (1 Point)           (in the previous month)                               (1 Point)  [ ] Abnormal pulmonary function test                     (1 Point)                 SURGERY RELATED RISK FACTORS  [ ] Acute myocardial infarction                              (1 Point)               [ ]  Section                                             (1 Point)  [ ] Congestive heart failure (in the previous month)  (1 Point)      [ ] Minor surgery                                                  (1 Point)   [ ] Inflammatory bowel disease                             (1 Point)               [ ] Arthroscopic surgery                                        (2 Points)  [ ] Central venous access                                      (2 Points)                [ x] General surgery lasting more than 45 minutes (2 points)  [ ] Present or previous malignancy                     (2 Points)                [ ] Elective arthroplasty                                         (5 points)    [ ] Stroke (in the previous month)                          (5 Points)                                                                                                                                                           HEMATOLOGY RELATED FACTORS                                                 TRAUMA RELATED RISK FACTORS  [ ] Prior episodes of VTE                                     (3 Points)                [ ] Fracture of the hip, pelvis, or leg                       (5 Points)  [ ] Positive family history for VTE                         (3 Points)             [ ] Acute spinal cord injury (in the previous month)  (5 Points)  [ ] Prothrombin 66488 A                                     (3 Points)               [ ] Paralysis  (less than 1 month)                             (5 Points)  [ ] Factor V Leiden                                             (3 Points)                  [ ] Multiple Trauma within 1 month                        (5 Points)  [ ] Lupus anticoagulants                                     (3 Points)                                                           [ ] Anticardiolipin antibodies                               (3 Points)                                                       [ ] High homocysteine in the blood                      (3 Points)                                             [ ] Other congenital or acquired thrombophilia      (3 Points)                                                [ ] Heparin induced thrombocytopenia                  (3 Points)                                     Total Score [    4      ]

## 2022-01-07 ENCOUNTER — OUTPATIENT (OUTPATIENT)
Dept: OUTPATIENT SERVICES | Facility: HOSPITAL | Age: 47
LOS: 1 days | End: 2022-01-07
Payer: COMMERCIAL

## 2022-01-07 DIAGNOSIS — Z98.82 BREAST IMPLANT STATUS: Chronic | ICD-10-CM

## 2022-01-07 DIAGNOSIS — Z11.52 ENCOUNTER FOR SCREENING FOR COVID-19: ICD-10-CM

## 2022-01-07 DIAGNOSIS — Z98.51 TUBAL LIGATION STATUS: Chronic | ICD-10-CM

## 2022-01-07 LAB — SARS-COV-2 RNA SPEC QL NAA+PROBE: SIGNIFICANT CHANGE UP

## 2022-01-09 ENCOUNTER — TRANSCRIPTION ENCOUNTER (OUTPATIENT)
Age: 47
End: 2022-01-09

## 2022-01-10 ENCOUNTER — INPATIENT (INPATIENT)
Facility: HOSPITAL | Age: 47
LOS: 2 days | Discharge: HOME | DRG: 454 | End: 2022-01-13
Attending: NEUROLOGICAL SURGERY | Admitting: NEUROLOGICAL SURGERY
Payer: COMMERCIAL

## 2022-01-10 VITALS
RESPIRATION RATE: 18 BRPM | SYSTOLIC BLOOD PRESSURE: 123 MMHG | WEIGHT: 141.98 LBS | TEMPERATURE: 98 F | DIASTOLIC BLOOD PRESSURE: 77 MMHG | HEART RATE: 85 BPM | HEIGHT: 64 IN | OXYGEN SATURATION: 95 %

## 2022-01-10 DIAGNOSIS — M47.816 SPONDYLOSIS WITHOUT MYELOPATHY OR RADICULOPATHY, LUMBAR REGION: ICD-10-CM

## 2022-01-10 DIAGNOSIS — Z98.51 TUBAL LIGATION STATUS: Chronic | ICD-10-CM

## 2022-01-10 DIAGNOSIS — Z98.82 BREAST IMPLANT STATUS: Chronic | ICD-10-CM

## 2022-01-10 LAB — HCG UR QL: NEGATIVE — SIGNIFICANT CHANGE UP

## 2022-01-10 RX ORDER — HYDROMORPHONE HYDROCHLORIDE 2 MG/ML
0.5 INJECTION INTRAMUSCULAR; INTRAVENOUS; SUBCUTANEOUS
Refills: 0 | Status: DISCONTINUED | OUTPATIENT
Start: 2022-01-10 | End: 2022-01-10

## 2022-01-10 RX ORDER — ACETAMINOPHEN 500 MG
650 TABLET ORAL EVERY 6 HOURS
Refills: 0 | Status: DISCONTINUED | OUTPATIENT
Start: 2022-01-10 | End: 2022-01-13

## 2022-01-10 RX ORDER — OXYCODONE HYDROCHLORIDE 5 MG/1
10 TABLET ORAL EVERY 4 HOURS
Refills: 0 | Status: DISCONTINUED | OUTPATIENT
Start: 2022-01-10 | End: 2022-01-12

## 2022-01-10 RX ORDER — INFLUENZA VIRUS VACCINE 15; 15; 15; 15 UG/.5ML; UG/.5ML; UG/.5ML; UG/.5ML
0.5 SUSPENSION INTRAMUSCULAR ONCE
Refills: 0 | Status: DISCONTINUED | OUTPATIENT
Start: 2022-01-10 | End: 2022-01-13

## 2022-01-10 RX ORDER — TRANEXAMIC ACID 100 MG/ML
650 INJECTION, SOLUTION INTRAVENOUS EVERY 6 HOURS
Refills: 0 | Status: COMPLETED | OUTPATIENT
Start: 2022-01-10 | End: 2022-01-11

## 2022-01-10 RX ORDER — SODIUM CHLORIDE 9 MG/ML
1000 INJECTION INTRAMUSCULAR; INTRAVENOUS; SUBCUTANEOUS
Refills: 0 | Status: DISCONTINUED | OUTPATIENT
Start: 2022-01-10 | End: 2022-01-12

## 2022-01-10 RX ORDER — OXYCODONE HYDROCHLORIDE 5 MG/1
5 TABLET ORAL EVERY 4 HOURS
Refills: 0 | Status: DISCONTINUED | OUTPATIENT
Start: 2022-01-10 | End: 2022-01-12

## 2022-01-10 RX ORDER — SENNA PLUS 8.6 MG/1
2 TABLET ORAL AT BEDTIME
Refills: 0 | Status: DISCONTINUED | OUTPATIENT
Start: 2022-01-10 | End: 2022-01-13

## 2022-01-10 RX ORDER — HYDROMORPHONE HYDROCHLORIDE 2 MG/ML
1 INJECTION INTRAMUSCULAR; INTRAVENOUS; SUBCUTANEOUS
Refills: 0 | Status: DISCONTINUED | OUTPATIENT
Start: 2022-01-10 | End: 2022-01-10

## 2022-01-10 RX ORDER — HYDROMORPHONE HYDROCHLORIDE 2 MG/ML
0.5 INJECTION INTRAMUSCULAR; INTRAVENOUS; SUBCUTANEOUS EVERY 4 HOURS
Refills: 0 | Status: DISCONTINUED | OUTPATIENT
Start: 2022-01-10 | End: 2022-01-11

## 2022-01-10 RX ORDER — CEFAZOLIN SODIUM 1 G
2000 VIAL (EA) INJECTION EVERY 8 HOURS
Refills: 0 | Status: COMPLETED | OUTPATIENT
Start: 2022-01-10 | End: 2022-01-11

## 2022-01-10 RX ORDER — ENOXAPARIN SODIUM 100 MG/ML
40 INJECTION SUBCUTANEOUS AT BEDTIME
Refills: 0 | Status: DISCONTINUED | OUTPATIENT
Start: 2022-01-11 | End: 2022-01-13

## 2022-01-10 RX ORDER — DIAZEPAM 5 MG
5 TABLET ORAL EVERY 8 HOURS
Refills: 0 | Status: DISCONTINUED | OUTPATIENT
Start: 2022-01-10 | End: 2022-01-11

## 2022-01-10 RX ORDER — ONDANSETRON 8 MG/1
4 TABLET, FILM COATED ORAL EVERY 6 HOURS
Refills: 0 | Status: DISCONTINUED | OUTPATIENT
Start: 2022-01-10 | End: 2022-01-10

## 2022-01-10 RX ADMIN — TRANEXAMIC ACID 213 MILLIGRAM(S): 100 INJECTION, SOLUTION INTRAVENOUS at 17:54

## 2022-01-10 RX ADMIN — HYDROMORPHONE HYDROCHLORIDE 0.5 MILLIGRAM(S): 2 INJECTION INTRAMUSCULAR; INTRAVENOUS; SUBCUTANEOUS at 20:54

## 2022-01-10 RX ADMIN — HYDROMORPHONE HYDROCHLORIDE 1 MILLIGRAM(S): 2 INJECTION INTRAMUSCULAR; INTRAVENOUS; SUBCUTANEOUS at 14:50

## 2022-01-10 RX ADMIN — SODIUM CHLORIDE 75 MILLILITER(S): 9 INJECTION INTRAMUSCULAR; INTRAVENOUS; SUBCUTANEOUS at 13:47

## 2022-01-10 RX ADMIN — SENNA PLUS 2 TABLET(S): 8.6 TABLET ORAL at 21:00

## 2022-01-10 RX ADMIN — SODIUM CHLORIDE 75 MILLILITER(S): 9 INJECTION INTRAMUSCULAR; INTRAVENOUS; SUBCUTANEOUS at 20:58

## 2022-01-10 RX ADMIN — HYDROMORPHONE HYDROCHLORIDE 1 MILLIGRAM(S): 2 INJECTION INTRAMUSCULAR; INTRAVENOUS; SUBCUTANEOUS at 14:00

## 2022-01-10 RX ADMIN — TRANEXAMIC ACID 213 MILLIGRAM(S): 100 INJECTION, SOLUTION INTRAVENOUS at 23:30

## 2022-01-10 RX ADMIN — Medication 100 MILLIGRAM(S): at 20:21

## 2022-01-10 RX ADMIN — HYDROMORPHONE HYDROCHLORIDE 1 MILLIGRAM(S): 2 INJECTION INTRAMUSCULAR; INTRAVENOUS; SUBCUTANEOUS at 14:35

## 2022-01-10 RX ADMIN — Medication 5 MILLIGRAM(S): at 18:08

## 2022-01-10 RX ADMIN — HYDROMORPHONE HYDROCHLORIDE 0.5 MILLIGRAM(S): 2 INJECTION INTRAMUSCULAR; INTRAVENOUS; SUBCUTANEOUS at 21:24

## 2022-01-10 RX ADMIN — OXYCODONE HYDROCHLORIDE 10 MILLIGRAM(S): 5 TABLET ORAL at 17:00

## 2022-01-10 RX ADMIN — OXYCODONE HYDROCHLORIDE 10 MILLIGRAM(S): 5 TABLET ORAL at 16:14

## 2022-01-10 RX ADMIN — HYDROMORPHONE HYDROCHLORIDE 1 MILLIGRAM(S): 2 INJECTION INTRAMUSCULAR; INTRAVENOUS; SUBCUTANEOUS at 13:45

## 2022-01-10 NOTE — BRIEF OPERATIVE NOTE - NSICDXBRIEFPROCEDURE_GEN_ALL_CORE_FT
PROCEDURES:  Fusion, spine, lumbar, PLIF, 1 level 10-Martin-2022 13:29:23  Salomón Morris   BECKY on CKD  - Creatinine improving   - avoid nephrotoxic medications  - Nephro following - BECKY on CKD  - Creatinine improving   - avoid nephrotoxic medications  - Nephro following

## 2022-01-10 NOTE — PHYSICAL THERAPY INITIAL EVALUATION ADULT - LIVES WITH, PROFILE
Pt lives with  and 2 kids in a house with 10 steps to enter +railing, and 15 steps to 2nd floor with +railing/children/spouse

## 2022-01-10 NOTE — PHYSICAL THERAPY INITIAL EVALUATION ADULT - PERTINENT HX OF CURRENT PROBLEM, REHAB EVAL
47 y/o female with chronic back pain  s/p multiple conservative management with no good improved followed by neurosurgery. Presents to PST for scheduled Left L4- L5   Facectomy, Decompression Laminectomy  l4-  5 ,l5 s1 lumbar posterior interbody Fusion on 1/10/22

## 2022-01-10 NOTE — PHYSICAL THERAPY INITIAL EVALUATION ADULT - GENERAL OBSERVATIONS, REHAB EVAL
Pt s/p L5-S1 PLIF with L L4-L5 hemilaminectomy forminotomy and ICBG on 1/10, spinal precautions reviewed and maintained t/o session. Pt received semi-supine in bed in NAD, +2 Hemovacs, +morton, A&Ox4, agreeable to PT eval.

## 2022-01-10 NOTE — PATIENT PROFILE ADULT - FALL HARM RISK - HARM RISK INTERVENTIONS

## 2022-01-10 NOTE — PROGRESS NOTE ADULT - SUBJECTIVE AND OBJECTIVE BOX
Patient seen and examined s/p L5-S1 PLIF w/ L L4-5 hemilami foraminotomy and ICBG .     Doing well. Awake, alert, oriented x 3. ISAACS strongly. SILT.     T(C): 36.6 (01-10-22 @ 13:12), Max: 36.8 (01-10-22 @ 05:52)  HR: 55 (01-10-22 @ 15:30) (55 - 89)  BP: 118/71 (01-10-22 @ 15:00) (117/65 - 137/64)  RR: 16 (01-10-22 @ 15:30) (16 - 20)  SpO2: 99% (01-10-22 @ 15:30) (95% - 100%)                    CAPILLARY BLOOD GLUCOSE

## 2022-01-10 NOTE — PHYSICAL THERAPY INITIAL EVALUATION ADULT - PLANNED THERAPY INTERVENTIONS, PT EVAL
Stair training: GOAL: Pt will negotiate 15 steps using 1 handrail independently in 2 weeks./bed mobility training/gait training/transfer training

## 2022-01-10 NOTE — PRE-ANESTHESIA EVALUATION ADULT - NSANTHPMHFT_GEN_ALL_CORE
47yo F with chronic low back pain (no current neuro symptoms) and sickle cell trait here for L4-S1 decompression  ET > 4 METS, no known cardiopulmonary disease  Reports easy bleeding / bruising but no transfusions

## 2022-01-10 NOTE — PHYSICAL THERAPY INITIAL EVALUATION ADULT - ADDITIONAL COMMENTS
PTA pt was independent with all functional mobility and performed all ADLs independently. Pt has a bench in shower

## 2022-01-11 LAB
ANION GAP SERPL CALC-SCNC: 10 MMOL/L — SIGNIFICANT CHANGE UP (ref 5–17)
BASOPHILS # BLD AUTO: 0 K/UL — SIGNIFICANT CHANGE UP (ref 0–0.2)
BASOPHILS NFR BLD AUTO: 0 % — SIGNIFICANT CHANGE UP (ref 0–2)
BUN SERPL-MCNC: 12 MG/DL — SIGNIFICANT CHANGE UP (ref 7–23)
CALCIUM SERPL-MCNC: 8.4 MG/DL — SIGNIFICANT CHANGE UP (ref 8.4–10.5)
CHLORIDE SERPL-SCNC: 107 MMOL/L — SIGNIFICANT CHANGE UP (ref 96–108)
CO2 SERPL-SCNC: 21 MMOL/L — LOW (ref 22–31)
CREAT SERPL-MCNC: 0.65 MG/DL — SIGNIFICANT CHANGE UP (ref 0.5–1.3)
EOSINOPHIL # BLD AUTO: 0 K/UL — SIGNIFICANT CHANGE UP (ref 0–0.5)
EOSINOPHIL NFR BLD AUTO: 0 % — SIGNIFICANT CHANGE UP (ref 0–6)
GLUCOSE SERPL-MCNC: 76 MG/DL — SIGNIFICANT CHANGE UP (ref 70–99)
HCT VFR BLD CALC: 29.1 % — LOW (ref 34.5–45)
HGB BLD-MCNC: 10 G/DL — LOW (ref 11.5–15.5)
LYMPHOCYTES # BLD AUTO: 1.57 K/UL — SIGNIFICANT CHANGE UP (ref 1–3.3)
LYMPHOCYTES # BLD AUTO: 14.6 % — SIGNIFICANT CHANGE UP (ref 13–44)
MCHC RBC-ENTMCNC: 23.3 PG — LOW (ref 27–34)
MCHC RBC-ENTMCNC: 34.4 GM/DL — SIGNIFICANT CHANGE UP (ref 32–36)
MCV RBC AUTO: 67.7 FL — LOW (ref 80–100)
MONOCYTES # BLD AUTO: 0.84 K/UL — SIGNIFICANT CHANGE UP (ref 0–0.9)
MONOCYTES NFR BLD AUTO: 7.8 % — SIGNIFICANT CHANGE UP (ref 2–14)
NEUTROPHILS # BLD AUTO: 8.32 K/UL — HIGH (ref 1.8–7.4)
NEUTROPHILS NFR BLD AUTO: 77.6 % — HIGH (ref 43–77)
PLATELET # BLD AUTO: 176 K/UL — SIGNIFICANT CHANGE UP (ref 150–400)
POTASSIUM SERPL-MCNC: 4.1 MMOL/L — SIGNIFICANT CHANGE UP (ref 3.5–5.3)
POTASSIUM SERPL-SCNC: 4.1 MMOL/L — SIGNIFICANT CHANGE UP (ref 3.5–5.3)
RBC # BLD: 4.3 M/UL — SIGNIFICANT CHANGE UP (ref 3.8–5.2)
RBC # FLD: 15.7 % — HIGH (ref 10.3–14.5)
SODIUM SERPL-SCNC: 138 MMOL/L — SIGNIFICANT CHANGE UP (ref 135–145)
WBC # BLD: 10.72 K/UL — HIGH (ref 3.8–10.5)
WBC # FLD AUTO: 10.72 K/UL — HIGH (ref 3.8–10.5)

## 2022-01-11 RX ORDER — CYCLOBENZAPRINE HYDROCHLORIDE 10 MG/1
10 TABLET, FILM COATED ORAL THREE TIMES A DAY
Refills: 0 | Status: DISCONTINUED | OUTPATIENT
Start: 2022-01-11 | End: 2022-01-12

## 2022-01-11 RX ORDER — LANOLIN ALCOHOL/MO/W.PET/CERES
3 CREAM (GRAM) TOPICAL AT BEDTIME
Refills: 0 | Status: DISCONTINUED | OUTPATIENT
Start: 2022-01-11 | End: 2022-01-13

## 2022-01-11 RX ORDER — POLYETHYLENE GLYCOL 3350 17 G/17G
17 POWDER, FOR SOLUTION ORAL DAILY
Refills: 0 | Status: DISCONTINUED | OUTPATIENT
Start: 2022-01-11 | End: 2022-01-13

## 2022-01-11 RX ORDER — BENZOCAINE AND MENTHOL 5; 1 G/100ML; G/100ML
1 LIQUID ORAL
Refills: 0 | Status: DISCONTINUED | OUTPATIENT
Start: 2022-01-11 | End: 2022-01-13

## 2022-01-11 RX ORDER — MORPHINE SULFATE 50 MG/1
1 CAPSULE, EXTENDED RELEASE ORAL
Refills: 0 | Status: DISCONTINUED | OUTPATIENT
Start: 2022-01-11 | End: 2022-01-12

## 2022-01-11 RX ADMIN — ENOXAPARIN SODIUM 40 MILLIGRAM(S): 100 INJECTION SUBCUTANEOUS at 21:13

## 2022-01-11 RX ADMIN — MORPHINE SULFATE 1 MILLIGRAM(S): 50 CAPSULE, EXTENDED RELEASE ORAL at 21:42

## 2022-01-11 RX ADMIN — SODIUM CHLORIDE 75 MILLILITER(S): 9 INJECTION INTRAMUSCULAR; INTRAVENOUS; SUBCUTANEOUS at 08:36

## 2022-01-11 RX ADMIN — Medication 100 MILLIGRAM(S): at 03:25

## 2022-01-11 RX ADMIN — MORPHINE SULFATE 1 MILLIGRAM(S): 50 CAPSULE, EXTENDED RELEASE ORAL at 08:36

## 2022-01-11 RX ADMIN — OXYCODONE HYDROCHLORIDE 5 MILLIGRAM(S): 5 TABLET ORAL at 01:32

## 2022-01-11 RX ADMIN — SENNA PLUS 2 TABLET(S): 8.6 TABLET ORAL at 21:13

## 2022-01-11 RX ADMIN — Medication 3 MILLIGRAM(S): at 21:13

## 2022-01-11 RX ADMIN — BENZOCAINE AND MENTHOL 1 LOZENGE: 5; 1 LIQUID ORAL at 15:56

## 2022-01-11 RX ADMIN — SODIUM CHLORIDE 75 MILLILITER(S): 9 INJECTION INTRAMUSCULAR; INTRAVENOUS; SUBCUTANEOUS at 10:29

## 2022-01-11 RX ADMIN — MORPHINE SULFATE 1 MILLIGRAM(S): 50 CAPSULE, EXTENDED RELEASE ORAL at 21:12

## 2022-01-11 RX ADMIN — OXYCODONE HYDROCHLORIDE 10 MILLIGRAM(S): 5 TABLET ORAL at 01:32

## 2022-01-11 RX ADMIN — OXYCODONE HYDROCHLORIDE 10 MILLIGRAM(S): 5 TABLET ORAL at 02:02

## 2022-01-11 RX ADMIN — TRANEXAMIC ACID 213 MILLIGRAM(S): 100 INJECTION, SOLUTION INTRAVENOUS at 05:02

## 2022-01-11 RX ADMIN — HYDROMORPHONE HYDROCHLORIDE 0.5 MILLIGRAM(S): 2 INJECTION INTRAMUSCULAR; INTRAVENOUS; SUBCUTANEOUS at 06:43

## 2022-01-11 RX ADMIN — MORPHINE SULFATE 1 MILLIGRAM(S): 50 CAPSULE, EXTENDED RELEASE ORAL at 15:55

## 2022-01-11 RX ADMIN — CYCLOBENZAPRINE HYDROCHLORIDE 10 MILLIGRAM(S): 10 TABLET, FILM COATED ORAL at 10:29

## 2022-01-11 RX ADMIN — POLYETHYLENE GLYCOL 3350 17 GRAM(S): 17 POWDER, FOR SOLUTION ORAL at 12:13

## 2022-01-11 RX ADMIN — TRANEXAMIC ACID 213 MILLIGRAM(S): 100 INJECTION, SOLUTION INTRAVENOUS at 12:13

## 2022-01-11 RX ADMIN — HYDROMORPHONE HYDROCHLORIDE 0.5 MILLIGRAM(S): 2 INJECTION INTRAMUSCULAR; INTRAVENOUS; SUBCUTANEOUS at 06:13

## 2022-01-11 NOTE — PROGRESS NOTE ADULT - SUBJECTIVE AND OBJECTIVE BOX
SUBJECTIVE: Doing well, NAD, but c/o back pain and Oxy not helping much.     OVERNIGHT EVENTS: None    Vital Signs Last 24 Hrs  T(C): 37.2 (11 Jan 2022 05:32), Max: 37.3 (10 Martin 2022 21:45)  T(F): 98.9 (11 Jan 2022 05:32), Max: 99.2 (10 Martin 2022 21:45)  HR: 60 (11 Jan 2022 05:32) (54 - 89)  BP: 120/73 (11 Jan 2022 05:32) (108/70 - 137/64)  BP(mean): 93 (10 Martin 2022 19:00) (82 - 101)  RR: 18 (11 Jan 2022 05:32) (16 - 20)  SpO2: 100% (11 Jan 2022 05:32) (96% - 100%)  IVF: [ ] IVL [X ] NS 75/H  DIET: [X ] Regular [ ] CCD [ ] Renal [ ] Puree [ ] Dysphagia [ ] Tube Feeds:   PCA: [ ] YES [X ] NO   LÓPEZ: [X ] YES [ ] NO [ ] VOID DC'd 1/11  BM: [ ] YES [X ] NO     DRAINS: [ ] NILAY (cc/24h) [X ] HMV LT/Vg=863/50 (cc/24h)    PHYSICAL EXAM:    Constitutional: No Acute Distress     Neurological: AOx3, Following Commands, Moving all Extremities     Motor exam:          Upper extremity                         Delt     Bicep     Tricep    HG                                                 R         5/5        5/5        5/5       5/5                                               L          5/5        5/5        5/5       5/5          Lower extremity                        HF         KF        KE       DF         PF                                                  R        5/5        5/5        5/5       5/5         5/5                                               L         5/5        5/5       5/5       5/5          5/5                                                 Sensation: [] intact to light touch  [] decreased:     Pulmonary: Clear to Auscultation, No rales, No rhonchi, No wheezes     Cardiovascular: S1, S2, Regular rate and rhythm     Gastrointestinal: Soft, Non-tender, Non-distended     Extremities: No calf tenderness     Incision:     LABS:         IMAGING:    MEDICATIONS  (STANDING):  enoxaparin Injectable 40 milliGRAM(s) SubCutaneous at bedtime  influenza   Vaccine 0.5 milliLiter(s) IntraMuscular once  melatonin 3 milliGRAM(s) Oral at bedtime  polyethylene glycol 3350 17 Gram(s) Oral daily  senna 2 Tablet(s) Oral at bedtime  sodium chloride 0.9%. 1000 milliLiter(s) (75 mL/Hr) IV Continuous <Continuous>  tranexamic acid IVPB 650 milliGRAM(s) IV Intermittent every 6 hours    MEDICATIONS  (PRN):  acetaminophen     Tablet .. 650 milliGRAM(s) Oral every 6 hours PRN Temp greater or equal to 38C (100.4F), Mild Pain (1 - 3)  cyclobenzaprine 10 milliGRAM(s) Oral three times a day PRN Muscle Spasm  morphine  - Injectable 1 milliGRAM(s) IV Push every 3 hours PRN Severe Pain (7 - 10)  oxyCODONE    IR 5 milliGRAM(s) Oral every 4 hours PRN Moderate Pain (4 - 6)  oxyCODONE    IR 10 milliGRAM(s) Oral every 4 hours PRN Severe Pain (7 - 10)   SUBJECTIVE: Doing well, NAD, but c/o back pain and Oxy not helping much.     OVERNIGHT EVENTS: None    Vital Signs Last 24 Hrs  T(C): 37.2 (11 Jan 2022 05:32), Max: 37.3 (10 Martin 2022 21:45)  T(F): 98.9 (11 Jan 2022 05:32), Max: 99.2 (10 Martin 2022 21:45)  HR: 60 (11 Jan 2022 05:32) (54 - 89)  BP: 120/73 (11 Jan 2022 05:32) (108/70 - 137/64)  BP(mean): 93 (10 Martin 2022 19:00) (82 - 101)  RR: 18 (11 Jan 2022 05:32) (16 - 20)  SpO2: 100% (11 Jan 2022 05:32) (96% - 100%)  IVF: [ ] IVL [X ] NS 75/H  DIET: [X ] Regular [ ] CCD [ ] Renal [ ] Puree [ ] Dysphagia [ ] Tube Feeds:   PCA: [ ] YES [X ] NO   LÓPEZ: [X ] YES [ ] NO [ ] VOID DC'd 1/11  BM: [ ] YES [X ] NO     DRAINS: [ ] NILAY (cc/24h) [X ] HMV LT/Lj=487/50 (cc/24h)    PHYSICAL EXAM:    Constitutional: No Acute Distress     Neurological: AOx3, Following Commands, Moving all Extremities     Motor exam:          Upper extremity                         Delt     Bicep     Tricep    HG                                                 R         5/5        5/5        5/5       5/5                                               L          5/5        5/5        5/5       5/5          Lower extremity                        HF         KF        KE       DF         PF                                                  R        5/5        5/5        5/5       5/5         5/5                                               L         5/5        5/5       5/5       5/5          5/5                                                 Sensation: [X] intact to light touch  [] decreased:     Pulmonary: Clear to Auscultation, No rales, No rhonchi, No wheezes     Cardiovascular: S1, S2, Regular rate and rhythm     Gastrointestinal: Soft, Non-tender, Non-distended     Extremities: No calf tenderness     Incision: HMV x 2 active. Hypofix dsg-CDI/Flat    LABS:    COVID-19 PCR: NotDetec (07 Jan 2022 13:32)    COVID-19 Vaccine Information:  History of COVID-19 vaccination: Yes  Brand of COVID-19 vaccination: Pfizer dose 1, 2, and 3  Date of last vaccination: 11-Nov-2021   Have you had a COVID-19 booster?: Yes  Brand of COVID-19 booster: Pfizer  Date of latest COVID-19 booster: 11-Nov-2021   Will the patient accept the COVID-19 vaccine if eligible and it is available?: No    IMAGING:    MEDICATIONS  (STANDING):  enoxaparin Injectable 40 milliGRAM(s) SubCutaneous at bedtime  influenza   Vaccine 0.5 milliLiter(s) IntraMuscular once  melatonin 3 milliGRAM(s) Oral at bedtime  polyethylene glycol 3350 17 Gram(s) Oral daily  senna 2 Tablet(s) Oral at bedtime  sodium chloride 0.9%. 1000 milliLiter(s) (75 mL/Hr) IV Continuous <Continuous>  tranexamic acid IVPB 650 milliGRAM(s) IV Intermittent every 6 hours    MEDICATIONS  (PRN):  acetaminophen     Tablet .. 650 milliGRAM(s) Oral every 6 hours PRN Temp greater or equal to 38C (100.4F), Mild Pain (1 - 3)  cyclobenzaprine 10 milliGRAM(s) Oral three times a day PRN Muscle Spasm  morphine  - Injectable 1 milliGRAM(s) IV Push every 3 hours PRN Severe Pain (7 - 10)  oxyCODONE    IR 5 milliGRAM(s) Oral every 4 hours PRN Moderate Pain (4 - 6)  oxyCODONE    IR 10 milliGRAM(s) Oral every 4 hours PRN Severe Pain (7 - 10)

## 2022-01-11 NOTE — CHART NOTE - NSCHARTNOTEFT_GEN_A_CORE
CAPRINI SCORE [CLOT] Score on Admission for     AGE RELATED RISK FACTORS                                                       MOBILITY RELATED FACTORS  [X ] Age 41-60 years                                            (1 Point)                  [ ] Bed rest                                                        (1 Point)  [ ] Age: 61-74 years                                           (2 Points)                 [ ] Plaster cast                                                   (2 Points)  [ ] Age= 75 years                                              (3 Points)                 [ ] Bed bound for more than 72 hours                 (2 Points)    DISEASE RELATED RISK FACTORS                                               GENDER SPECIFIC FACTORS  [ ] Edema in the lower extremities                       (1 Point)                  [ ] Pregnancy                                                     (1 Point)  [ ] Varicose veins                                               (1 Point)                  [ ] Post-partum < 6 weeks                                   (1 Point)             [ ] BMI > 25 Kg/m2                                            (1 Point)                  [ ] Hormonal therapy  or oral contraception          (1 Point)                 [ ] Sepsis (in the previous month)                        (1 Point)                  [ ] History of pregnancy complications                 (1 point)  [ ] Pneumonia or serious lung disease                                               [ ] Unexplained or recurrent                     (1 Point)           (in the previous month)                               (1 Point)  [ ] Abnormal pulmonary function test                     (1 Point)                 SURGERY RELATED RISK FACTORS (include planned surgeries)  [ ] Acute myocardial infarction                              (1 Point)                 [ ]  Section                                             (1 Point)  [ ] Congestive heart failure (in the previous month)  (1 Point)         [ ] Minor surgery                                                  (1 Point)   [ ] Inflammatory bowel disease                             (1 Point)                 [ ] Arthroscopic surgery                                        (2 Points)  [ ] Central venous access                                      (2 Points)                [X] General surgery lasting more than 45 minutes   (2 Points)       [ ] Stroke (in the previous month)                          (5 Points)               [ ] Elective arthroplasty                                         (5 Points)            [ ] current or past malignancy                              (2 Points)                                                                                                       HEMATOLOGY RELATED FACTORS                                                 TRAUMA RELATED RISK FACTORS  [ ] Prior episodes of VTE                                     (3 Points)                [ ] Fracture of the hip, pelvis, or leg                       (5 Points)  [ ] Positive family history for VTE                         (3 Points)                 [ ] Acute spinal cord injury (in the previous month)  (5 Points)  [ ] Prothrombin 89016 A                                     (3 Points)                 [ ] Paralysis  (less than 1 month)                             (5 Points)  [ ] Factor V Leiden                                             (3 Points)                  [ ] Multiple Trauma within 1 month                        (5 Points)  [ ] Lupus anticoagulants                                     (3 Points)                                                           [ ] Anticardiolipin antibodies                               (3 Points)                                                       [ ] High homocysteine in the blood                      (3 Points)                                             [ ] Other congenital or acquired thrombophilia      (3 Points)                                                [ ] Heparin induced thrombocytopenia                  (3 Points)                                          Total Score [     3     ]    Risk:  Very low 0   Low 1 to 2   Moderate 3 to 4   High =5       VTE Prophylasix Recommednations:  [X] mechanical pneumatic compression devices                                      [ ] contraindicated: _____________________  [ X] chemo prophylasix                                                                                   [ ] contraindicated _____________________    **** HIGH LIKELIHOOD DVT PRESENT ON ADMISSION  [ ] (please order LE dopplers within 24 hours of admission)

## 2022-01-11 NOTE — OCCUPATIONAL THERAPY INITIAL EVALUATION ADULT - PERTINENT HX OF CURRENT PROBLEM, REHAB EVAL
47 y/o female with chronic back pain  s/p multiple conservative management with no good improved followed by neurosurgery. Presents to PST for scheduled Left L4- L5   Facectomy, Decompression Laminectomy  l4-  5 ,l5 s1 lumbar posterior interbody Fusion on 1/10/22. See below

## 2022-01-11 NOTE — PROGRESS NOTE ADULT - NUTRITIONAL ASSESSMENT
45 y/o female with chronic back pain  s/p multiple conservative management with no good improved followed by neurosurgery. Presents to PST for scheduled Left L4- L5   Facectomy, Decompression Laminectomy  l4-  5 ,l5 s1 lumbar posterior interbody Fusion on 1/10/22.    Covid test 1/7/21 (13 Dec 2021 08:54)    PROCEDURE: Adm 1/10 L5-S1 PLIF, Lt L4-5 Foraminotomy w/ICBG      POD#1    PLAN:  Neuro: Cont HMV drain x 2. TXA protocal. 1/11 DC Diggs to TOV. Pain Control-Add Flexeril, Changed Dilaudid IV to Morphine per pt req, Labs-P FU.  Inc activity/OOB.     Respiratory: Patient instructed to use incentive spirometer [ X] YES [ ] NO              DVT ppx: [X ] SQL [ ] SQH and Venodynes [ ] Left [ ] Right [ X] Bilateral    Discharge Planning:  The patient was evaluated by PT and recommended Home PT w/RW.  OT-P.    More than 30 minutes spent on total encounter: more than 50% of the visit was spent on educating the patient and family regarding condition, medications, follow up plans, signs and symptoms to be concerned with, preparing paperwork, and questions answered regarding discharge.

## 2022-01-11 NOTE — OCCUPATIONAL THERAPY INITIAL EVALUATION ADULT - LIVES WITH, PROFILE
Pt lives in private home with , 10 steps to enter, flight to bedroom, walk in shower with bench. Pt I in ADLs and ambulation prior to admission/children/spouse

## 2022-01-12 LAB
HCT VFR BLD CALC: 31.1 % — LOW (ref 34.5–45)
HGB BLD-MCNC: 10.6 G/DL — LOW (ref 11.5–15.5)
MCHC RBC-ENTMCNC: 23.6 PG — LOW (ref 27–34)
MCHC RBC-ENTMCNC: 34.1 GM/DL — SIGNIFICANT CHANGE UP (ref 32–36)
MCV RBC AUTO: 69.1 FL — LOW (ref 80–100)
NRBC # BLD: 0 /100 WBCS — SIGNIFICANT CHANGE UP (ref 0–0)
PLATELET # BLD AUTO: 165 K/UL — SIGNIFICANT CHANGE UP (ref 150–400)
RBC # BLD: 4.5 M/UL — SIGNIFICANT CHANGE UP (ref 3.8–5.2)
RBC # FLD: 15.3 % — HIGH (ref 10.3–14.5)
WBC # BLD: 10.15 K/UL — SIGNIFICANT CHANGE UP (ref 3.8–10.5)
WBC # FLD AUTO: 10.15 K/UL — SIGNIFICANT CHANGE UP (ref 3.8–10.5)

## 2022-01-12 RX ORDER — MORPHINE SULFATE 50 MG/1
1 CAPSULE, EXTENDED RELEASE ORAL EVERY 4 HOURS
Refills: 0 | Status: DISCONTINUED | OUTPATIENT
Start: 2022-01-12 | End: 2022-01-13

## 2022-01-12 RX ORDER — MORPHINE SULFATE 50 MG/1
7.5 CAPSULE, EXTENDED RELEASE ORAL EVERY 4 HOURS
Refills: 0 | Status: DISCONTINUED | OUTPATIENT
Start: 2022-01-12 | End: 2022-01-13

## 2022-01-12 RX ORDER — LIDOCAINE 4 G/100G
15 CREAM TOPICAL
Refills: 0 | Status: DISCONTINUED | OUTPATIENT
Start: 2022-01-12 | End: 2022-01-13

## 2022-01-12 RX ORDER — MORPHINE SULFATE 50 MG/1
15 CAPSULE, EXTENDED RELEASE ORAL EVERY 4 HOURS
Refills: 0 | Status: DISCONTINUED | OUTPATIENT
Start: 2022-01-12 | End: 2022-01-13

## 2022-01-12 RX ORDER — CYCLOBENZAPRINE HYDROCHLORIDE 10 MG/1
10 TABLET, FILM COATED ORAL THREE TIMES A DAY
Refills: 0 | Status: DISCONTINUED | OUTPATIENT
Start: 2022-01-12 | End: 2022-01-13

## 2022-01-12 RX ORDER — MORPHINE SULFATE 50 MG/1
15 CAPSULE, EXTENDED RELEASE ORAL EVERY 4 HOURS
Refills: 0 | Status: DISCONTINUED | OUTPATIENT
Start: 2022-01-12 | End: 2022-01-12

## 2022-01-12 RX ADMIN — MORPHINE SULFATE 1 MILLIGRAM(S): 50 CAPSULE, EXTENDED RELEASE ORAL at 12:00

## 2022-01-12 RX ADMIN — ENOXAPARIN SODIUM 40 MILLIGRAM(S): 100 INJECTION SUBCUTANEOUS at 22:16

## 2022-01-12 RX ADMIN — MORPHINE SULFATE 1 MILLIGRAM(S): 50 CAPSULE, EXTENDED RELEASE ORAL at 01:32

## 2022-01-12 RX ADMIN — MORPHINE SULFATE 15 MILLIGRAM(S): 50 CAPSULE, EXTENDED RELEASE ORAL at 21:04

## 2022-01-12 RX ADMIN — MORPHINE SULFATE 1 MILLIGRAM(S): 50 CAPSULE, EXTENDED RELEASE ORAL at 07:50

## 2022-01-12 RX ADMIN — SODIUM CHLORIDE 75 MILLILITER(S): 9 INJECTION INTRAMUSCULAR; INTRAVENOUS; SUBCUTANEOUS at 04:47

## 2022-01-12 RX ADMIN — CYCLOBENZAPRINE HYDROCHLORIDE 10 MILLIGRAM(S): 10 TABLET, FILM COATED ORAL at 22:16

## 2022-01-12 RX ADMIN — Medication 3 MILLIGRAM(S): at 22:17

## 2022-01-12 RX ADMIN — MORPHINE SULFATE 1 MILLIGRAM(S): 50 CAPSULE, EXTENDED RELEASE ORAL at 17:48

## 2022-01-12 RX ADMIN — MORPHINE SULFATE 1 MILLIGRAM(S): 50 CAPSULE, EXTENDED RELEASE ORAL at 04:47

## 2022-01-12 RX ADMIN — MORPHINE SULFATE 1 MILLIGRAM(S): 50 CAPSULE, EXTENDED RELEASE ORAL at 23:30

## 2022-01-12 RX ADMIN — MORPHINE SULFATE 1 MILLIGRAM(S): 50 CAPSULE, EXTENDED RELEASE ORAL at 18:05

## 2022-01-12 RX ADMIN — MORPHINE SULFATE 1 MILLIGRAM(S): 50 CAPSULE, EXTENDED RELEASE ORAL at 23:00

## 2022-01-12 RX ADMIN — MORPHINE SULFATE 1 MILLIGRAM(S): 50 CAPSULE, EXTENDED RELEASE ORAL at 11:45

## 2022-01-12 RX ADMIN — LIDOCAINE 15 MILLILITER(S): 4 CREAM TOPICAL at 23:56

## 2022-01-12 RX ADMIN — SENNA PLUS 2 TABLET(S): 8.6 TABLET ORAL at 22:16

## 2022-01-12 RX ADMIN — MORPHINE SULFATE 15 MILLIGRAM(S): 50 CAPSULE, EXTENDED RELEASE ORAL at 20:34

## 2022-01-12 RX ADMIN — MORPHINE SULFATE 1 MILLIGRAM(S): 50 CAPSULE, EXTENDED RELEASE ORAL at 08:05

## 2022-01-12 RX ADMIN — MORPHINE SULFATE 1 MILLIGRAM(S): 50 CAPSULE, EXTENDED RELEASE ORAL at 02:02

## 2022-01-12 NOTE — PROGRESS NOTE ADULT - SUBJECTIVE AND OBJECTIVE BOX
SUBJECTIVE: Patient was seen and evaluated.     OVERNIGHT EVENTS:     Vital Signs Last 24 Hrs  T(C): 36.8 (12 Jan 2022 10:29), Max: 37.6 (12 Jan 2022 06:00)  T(F): 98.2 (12 Jan 2022 10:29), Max: 99.6 (12 Jan 2022 06:00)  HR: 92 (12 Jan 2022 10:29) (70 - 92)  BP: 123/68 (12 Jan 2022 10:29) (114/63 - 134/77)  BP(mean): --  RR: 18 (12 Jan 2022 10:29) (18 - 18)  SpO2: 98% (12 Jan 2022 10:29) (97% - 99%)  DRAINS:     PHYSICAL EXAM:    Constitutional: No Acute Distress     Neurological: AOx3, Following Commands, Moving all Extremities     Motor exam:          Upper extremity                         Delt     Bicep     Tricep    HG                                                 R         5/5        5/5        5/5       5/5                                               L          5/5        5/5        5/5       5/5          Lower extremity                        HF         KF        KE       DF         PF                                                  R        5/5        5/5        5/5       5/5         5/5                                               L         5/5        5/5       5/5       5/5          5/5                                                 Sensation: [] intact to light touch  [] decreased:     Pulmonary: Clear to Auscultation, No rales, No rhonchi, No wheezes     Cardiovascular: S1, S2, Regular rate and rhythm     Gastrointestinal: Soft, Non-tender, Non-distended     Extremities: No calf tenderness     Incision:   LABS:                        10.6   10.15 )-----------( 165      ( 12 Jan 2022 07:19 )             31.1    01-11    138  |  107  |  12  ----------------------------<  76  4.1   |  21<L>  |  0.65    Ca    8.4      11 Jan 2022 08:41        01-11 @ 07:01  -  01-12 @ 07:00  --------------------------------------------------------  IN: 1730 mL / OUT: 1280 mL / NET: 450 mL          MEDICATIONS:  Anticoagulation:   enoxaparin Injectable 40 milliGRAM(s) SubCutaneous at bedtime    Antibiotics:    Endo:    Neuro:  acetaminophen     Tablet .. 650 milliGRAM(s) Oral every 6 hours PRN Temp greater or equal to 38C (100.4F), Mild Pain (1 - 3)  cyclobenzaprine 10 milliGRAM(s) Oral three times a day PRN Muscle Spasm  melatonin 3 milliGRAM(s) Oral at bedtime  morphine  - Injectable 1 milliGRAM(s) IV Push every 3 hours PRN Severe Pain (7 - 10)  oxyCODONE    IR 5 milliGRAM(s) Oral every 4 hours PRN Moderate Pain (4 - 6)  oxyCODONE    IR 10 milliGRAM(s) Oral every 4 hours PRN Severe Pain (7 - 10)    Cardiac:    Pulm:    GI/:  polyethylene glycol 3350 17 Gram(s) Oral daily  senna 2 Tablet(s) Oral at bedtime    Other:   benzocaine 15 mG/menthol 3.6 mG (Sugar-Free) Lozenge 1 Lozenge Oral every 3 hours PRN Mouth Sores  influenza   Vaccine 0.5 milliLiter(s) IntraMuscular once  sodium chloride 0.9%. 1000 milliLiter(s) IV Continuous <Continuous>    DIET:     IMAGING:    SUBJECTIVE: Patient was seen and evaluated. Overall doing well, c/o moderate discomfort in R lower back near site of drain. HMV R with 20 cc in the last 48 hr, HMV L > 50 cc, as per nurse.     OVERNIGHT EVENTS:     Vital Signs Last 24 Hrs  T(C): 36.8 (12 Jan 2022 10:29), Max: 37.6 (12 Jan 2022 06:00)  T(F): 98.2 (12 Jan 2022 10:29), Max: 99.6 (12 Jan 2022 06:00)  HR: 92 (12 Jan 2022 10:29) (70 - 92)  BP: 123/68 (12 Jan 2022 10:29) (114/63 - 134/77)  BP(mean): --  RR: 18 (12 Jan 2022 10:29) (18 - 18)  SpO2: 98% (12 Jan 2022 10:29) (97% - 99%)  DRAINS:     PHYSICAL EXAM:    Constitutional: No Acute Distress     Neurological: AOx3, Following Commands, Moving all Extremities     Motor exam:          Upper extremity                         Delt     Bicep     Tricep    HG                                                 R         5/5        5/5        5/5       5/5                                               L          5/5        5/5        5/5       5/5          Lower extremity                        HF         KF        KE       DF         PF                                                  R        5/5        5/5        5/5       5/5         5/5                                               L         5/5        5/5       5/5       5/5          5/5                                                 Sensation: [x] intact to light touch  [] decreased:     Pulmonary: Clear to Auscultation, No rales, No rhonchi, No wheezes     Cardiovascular: S1, S2, Regular rate and rhythm     Gastrointestinal: Soft, Non-tender, Non-distended     Extremities: No calf tenderness     Incision: Dressing over incision site. Drains intact b/l.   LABS:                        10.6   10.15 )-----------( 165      ( 12 Jan 2022 07:19 )             31.1    01-11    138  |  107  |  12  ----------------------------<  76  4.1   |  21<L>  |  0.65    Ca    8.4      11 Jan 2022 08:41        01-11 @ 07:01  -  01-12 @ 07:00  --------------------------------------------------------  IN: 1730 mL / OUT: 1280 mL / NET: 450 mL          MEDICATIONS:  Anticoagulation:   enoxaparin Injectable 40 milliGRAM(s) SubCutaneous at bedtime    Antibiotics:    Endo:    Neuro:  acetaminophen     Tablet .. 650 milliGRAM(s) Oral every 6 hours PRN Temp greater or equal to 38C (100.4F), Mild Pain (1 - 3)  cyclobenzaprine 10 milliGRAM(s) Oral three times a day PRN Muscle Spasm  melatonin 3 milliGRAM(s) Oral at bedtime  morphine  - Injectable 1 milliGRAM(s) IV Push every 3 hours PRN Severe Pain (7 - 10)  oxyCODONE    IR 5 milliGRAM(s) Oral every 4 hours PRN Moderate Pain (4 - 6)  oxyCODONE    IR 10 milliGRAM(s) Oral every 4 hours PRN Severe Pain (7 - 10)    Cardiac:    Pulm:    GI/:  polyethylene glycol 3350 17 Gram(s) Oral daily  senna 2 Tablet(s) Oral at bedtime    Other:   benzocaine 15 mG/menthol 3.6 mG (Sugar-Free) Lozenge 1 Lozenge Oral every 3 hours PRN Mouth Sores  influenza   Vaccine 0.5 milliLiter(s) IntraMuscular once  sodium chloride 0.9%. 1000 milliLiter(s) IV Continuous <Continuous>    DIET: Regular diet     IMAGING:

## 2022-01-13 ENCOUNTER — TRANSCRIPTION ENCOUNTER (OUTPATIENT)
Age: 47
End: 2022-01-13

## 2022-01-13 VITALS
SYSTOLIC BLOOD PRESSURE: 149 MMHG | DIASTOLIC BLOOD PRESSURE: 85 MMHG | TEMPERATURE: 99 F | OXYGEN SATURATION: 97 % | RESPIRATION RATE: 17 BRPM | HEART RATE: 100 BPM

## 2022-01-13 LAB
ANION GAP SERPL CALC-SCNC: 14 MMOL/L — SIGNIFICANT CHANGE UP (ref 5–17)
BUN SERPL-MCNC: 14 MG/DL — SIGNIFICANT CHANGE UP (ref 7–23)
CALCIUM SERPL-MCNC: 9.4 MG/DL — SIGNIFICANT CHANGE UP (ref 8.4–10.5)
CHLORIDE SERPL-SCNC: 101 MMOL/L — SIGNIFICANT CHANGE UP (ref 96–108)
CO2 SERPL-SCNC: 22 MMOL/L — SIGNIFICANT CHANGE UP (ref 22–31)
CREAT SERPL-MCNC: 0.62 MG/DL — SIGNIFICANT CHANGE UP (ref 0.5–1.3)
GLUCOSE SERPL-MCNC: 65 MG/DL — LOW (ref 70–99)
HCT VFR BLD CALC: 31.6 % — LOW (ref 34.5–45)
HGB BLD-MCNC: 10.9 G/DL — LOW (ref 11.5–15.5)
MCHC RBC-ENTMCNC: 23.7 PG — LOW (ref 27–34)
MCHC RBC-ENTMCNC: 34.5 GM/DL — SIGNIFICANT CHANGE UP (ref 32–36)
MCV RBC AUTO: 68.7 FL — LOW (ref 80–100)
NRBC # BLD: 0 /100 WBCS — SIGNIFICANT CHANGE UP (ref 0–0)
PLATELET # BLD AUTO: 165 K/UL — SIGNIFICANT CHANGE UP (ref 150–400)
POTASSIUM SERPL-MCNC: 3.8 MMOL/L — SIGNIFICANT CHANGE UP (ref 3.5–5.3)
POTASSIUM SERPL-SCNC: 3.8 MMOL/L — SIGNIFICANT CHANGE UP (ref 3.5–5.3)
RBC # BLD: 4.6 M/UL — SIGNIFICANT CHANGE UP (ref 3.8–5.2)
RBC # FLD: 15.6 % — HIGH (ref 10.3–14.5)
SODIUM SERPL-SCNC: 137 MMOL/L — SIGNIFICANT CHANGE UP (ref 135–145)
WBC # BLD: 7.21 K/UL — SIGNIFICANT CHANGE UP (ref 3.8–10.5)
WBC # FLD AUTO: 7.21 K/UL — SIGNIFICANT CHANGE UP (ref 3.8–10.5)

## 2022-01-13 RX ORDER — LANOLIN ALCOHOL/MO/W.PET/CERES
1 CREAM (GRAM) TOPICAL
Qty: 0 | Refills: 0 | DISCHARGE
Start: 2022-01-13

## 2022-01-13 RX ORDER — MULTIVIT WITH MIN/MFOLATE/K2 340-15/3 G
1 POWDER (GRAM) ORAL ONCE
Refills: 0 | Status: COMPLETED | OUTPATIENT
Start: 2022-01-13 | End: 2022-01-13

## 2022-01-13 RX ORDER — CYCLOBENZAPRINE HYDROCHLORIDE 10 MG/1
1 TABLET, FILM COATED ORAL
Qty: 30 | Refills: 0
Start: 2022-01-13

## 2022-01-13 RX ORDER — POLYETHYLENE GLYCOL 3350 17 G/17G
17 POWDER, FOR SOLUTION ORAL
Qty: 0 | Refills: 0 | DISCHARGE
Start: 2022-01-13

## 2022-01-13 RX ORDER — SENNA PLUS 8.6 MG/1
2 TABLET ORAL
Qty: 0 | Refills: 0 | DISCHARGE
Start: 2022-01-13

## 2022-01-13 RX ORDER — ACETAMINOPHEN 500 MG
2 TABLET ORAL
Qty: 0 | Refills: 0 | DISCHARGE
Start: 2022-01-13

## 2022-01-13 RX ORDER — MORPHINE SULFATE 50 MG/1
1 CAPSULE, EXTENDED RELEASE ORAL
Qty: 30 | Refills: 0
Start: 2022-01-13

## 2022-01-13 RX ADMIN — CYCLOBENZAPRINE HYDROCHLORIDE 10 MILLIGRAM(S): 10 TABLET, FILM COATED ORAL at 05:25

## 2022-01-13 RX ADMIN — LIDOCAINE 15 MILLILITER(S): 4 CREAM TOPICAL at 03:00

## 2022-01-13 RX ADMIN — LIDOCAINE 15 MILLILITER(S): 4 CREAM TOPICAL at 08:55

## 2022-01-13 RX ADMIN — LIDOCAINE 15 MILLILITER(S): 4 CREAM TOPICAL at 11:40

## 2022-01-13 RX ADMIN — LIDOCAINE 15 MILLILITER(S): 4 CREAM TOPICAL at 05:25

## 2022-01-13 RX ADMIN — Medication 1 BOTTLE: at 11:40

## 2022-01-13 NOTE — DISCHARGE NOTE NURSING/CASE MANAGEMENT/SOCIAL WORK - PATIENT PORTAL LINK FT
You can access the FollowMyHealth Patient Portal offered by [facility] by registering at the following website: [email or web address]. By joining [employer]’s FollowMyHealth portal, you will also be able to view your health information using other applications (apps) compatible with our system.

## 2022-01-13 NOTE — PROGRESS NOTE ADULT - SUBJECTIVE AND OBJECTIVE BOX
SUBJECTIVE: Patient seen and examined at bedside. Complains of incisional pain and constipation.     OVERNIGHT EVENTS: none     Vital Signs Last 24 Hrs  T(C): 36.6 (13 Jan 2022 05:30), Max: 37.7 (12 Jan 2022 13:27)  T(F): 97.9 (13 Jan 2022 05:30), Max: 99.8 (12 Jan 2022 13:27)  HR: 77 (13 Jan 2022 05:30) (72 - 92)  BP: 129/74 (13 Jan 2022 05:30) (109/70 - 129/75)  BP(mean): --  RR: 16 (13 Jan 2022 05:30) (16 - 18)  SpO2: 99% (13 Jan 2022 05:30) (98% - 100%)    PHYSICAL EXAM:    General: No Acute Distress     Neurological: Awake, alert oriented to person, place and time, Following Commands, PERRL, EOMI, Face Symmetrical, Speech Fluent, Moving all extremities, Muscle Strength normal in all four extremities, No Drift, Sensation to Light Touch Intact    Pulmonary: Clear to Auscultation, No Rales, No Rhonchi, No Wheezes     Cardiovascular: S1, S2, Regular Rate and Rhythm     Gastrointestinal: Soft, Nontender, Nondistended     Incision: c/d/i     LABS:                        10.9   7.21  )-----------( 165      ( 13 Jan 2022 08:10 )             31.6    01-13    137  |  101  |  14  ----------------------------<  65<L>  3.8   |  22  |  0.62    Ca    9.4      13 Jan 2022 08:10          01-12 @ 07:01  -  01-13 @ 07:00  --------------------------------------------------------  IN: 1170 mL / OUT: 335 mL / NET: 835 mL      DRAINS: + left hmv (95 cc/24 hours)     MEDICATIONS  (STANDING):  cyclobenzaprine 10 milliGRAM(s) Oral three times a day  enoxaparin Injectable 40 milliGRAM(s) SubCutaneous at bedtime  influenza   Vaccine 0.5 milliLiter(s) IntraMuscular once  lidocaine 2% Viscous 15 milliLiter(s) Swish and Spit every 3 hours  magnesium citrate Oral Solution 1 Bottle Oral once  melatonin 3 milliGRAM(s) Oral at bedtime  polyethylene glycol 3350 17 Gram(s) Oral daily  senna 2 Tablet(s) Oral at bedtime    MEDICATIONS  (PRN):  acetaminophen     Tablet .. 650 milliGRAM(s) Oral every 6 hours PRN Temp greater or equal to 38C (100.4F), Mild Pain (1 - 3)  benzocaine 15 mG/menthol 3.6 mG (Sugar-Free) Lozenge 1 Lozenge Oral every 3 hours PRN Mouth Sores  morphine  - Injectable 1 milliGRAM(s) IV Push every 4 hours PRN breakthrough pain  morphine  IR 7.5 milliGRAM(s) Oral every 4 hours PRN Moderate Pain (4 - 6)  morphine  IR 15 milliGRAM(s) Oral every 4 hours PRN Severe Pain (7 - 10)      DIET: regular diet     IMAGING: no new imaging

## 2022-01-13 NOTE — DISCHARGE NOTE PROVIDER - NSDCACTIVITY_GEN_ALL_CORE
Do not drive or operate machinery/Do not make important decisions/Stairs allowed/Walking - Indoors allowed/No heavy lifting/straining/Walking - Outdoors allowed/Follow Instructions Provided by your Surgical Team

## 2022-01-13 NOTE — DISCHARGE NOTE PROVIDER - NSDCCPCAREPLAN_GEN_ALL_CORE_FT
PRINCIPAL DISCHARGE DIAGNOSIS  Diagnosis: Spondylosis without myelopathy or radiculopathy, lumbar region  Assessment and Plan of Treatment: s/p L5-S1 PLIF with left L4-5 hemilaminectomy foraminotomy and ICBG on 1/10  Please follow up with Dr. Nolan's office for removal of drain.

## 2022-01-13 NOTE — PROGRESS NOTE ADULT - ASSESSMENT
45 y/o female with chronic back pain  s/p multiple conservative management with no good improved followed by neurosurgery. Presents to PST for scheduled Left L4- L5   Facectomy, Decompression Laminectomy  l4-  5 ,l5 s1 lumbar posterior interbody Fusion on 1/10/22.    Covid test 1/7/21 (13 Dec 2021 08:54)    PROCEDURE: 1/10 s/p L5-S1 PLIF with left L4-5 hemilaminectomy foraminotomy and ICBG  POD#3    PLAN:  -Neuro: continue drain and monitor output  -Morphine for pain control  -Flexeril for muscle spasms   -Regular diet  -Bowel regimen. Added mag citrate for constipation  -Post op anemia stable   -DVT ppx: venodynes and sql  -PT: home PT when stable    Above discussed with Dr. Nolan  Spectra #[id number]  
47 y/o female with chronic back pain  s/p multiple conservative management with no good improved followed by neurosurgery. Now s/p L5-S1 PLIF w/ L L4-5 hemilami foraminotomy and ICBG . AOx3, no facial, ISAACS 5/5, no hyperreflexia/clonus, SILT  -PACU 4hr then floor  -HMV x2  -kyler Diggs  -TXA protocol  -am labs  -PT/OT
47 y/o female with a hx of chronic back pain and sickle cell trait s/p multiple conservative management with no good improved followed by neurosurgery. Presents to PST for scheduled Left L4- L5 Facectomy, Decompression Laminectomy  l4- l5 ,l5-s1 lumbar posterior interbody Fusion on 1/10/22.  - Cont HMV drain on left until < 50 cc in 24 hr, will most likely reach goal and d/c tomorrow  - R HMV drain removed today.   - TXA protocal.   - Continue with Flexeril, and Morphine IVP prn.  - Inc activity/OOB.   - Pt pending d/c home with PT once left sided HMC removed.   
MEDICATIONS  (STANDING):  enoxaparin Injectable 40 milliGRAM(s) SubCutaneous at bedtime  influenza   Vaccine 0.5 milliLiter(s) IntraMuscular once  melatonin 3 milliGRAM(s) Oral at bedtime  polyethylene glycol 3350 17 Gram(s) Oral daily  senna 2 Tablet(s) Oral at bedtime  sodium chloride 0.9%. 1000 milliLiter(s) (75 mL/Hr) IV Continuous <Continuous>  tranexamic acid IVPB 650 milliGRAM(s) IV Intermittent every 6 hours    MEDICATIONS  (PRN):  acetaminophen     Tablet .. 650 milliGRAM(s) Oral every 6 hours PRN Temp greater or equal to 38C (100.4F), Mild Pain (1 - 3)  cyclobenzaprine 10 milliGRAM(s) Oral three times a day PRN Muscle Spasm  morphine  - Injectable 1 milliGRAM(s) IV Push every 3 hours PRN Severe Pain (7 - 10)  oxyCODONE    IR 5 milliGRAM(s) Oral every 4 hours PRN Moderate Pain (4 - 6)  oxyCODONE    IR 10 milliGRAM(s) Oral every 4 hours PRN Severe Pain (7 - 10)

## 2022-01-13 NOTE — DISCHARGE NOTE PROVIDER - CARE PROVIDER_API CALL
Nathaniel Nolan)  Neurosurgery  [address] [place] [address], [address]  [place] [address]  Phone: [phone]  Fax: [phone]  Follow Up Time:

## 2022-01-13 NOTE — PROGRESS NOTE ADULT - ATTENDING COMMENTS
Pt DC home prior to my evaluation.  Pt will come to my office tomorrow for Hemovac removal.
Pt doing well.  L leg pain improved. Has R ICBG site tenderness.  Ambulated with PT / RW.  Continue hemovacs.
Pt doing well.  Ambulating independently.  One hemovac DC'ed.  DC planning - possibly home tomorrow.

## 2022-01-13 NOTE — DISCHARGE NOTE NURSING/CASE MANAGEMENT/SOCIAL WORK - NSDCPEFALRISK_GEN_ALL_CORE
For information on Fall & Injury Prevention, visit: [email or web address] OR  [email or web address] OR  https://www.cdc.gov/steadi/patient.html

## 2022-01-13 NOTE — DISCHARGE NOTE PROVIDER - NSDCMRMEDTOKEN_GEN_ALL_CORE_FT
celecoxib 200 mg oral capsule: 1 cap(s) orally 2 times a day  Rolling Walker: S/P L5-S1 PLIF w/ICBG       acetaminophen 325 mg oral tablet: 2 tab(s) orally every 6 hours, As needed, Temp greater or equal to 38C (100.4F), Mild Pain (1 - 3)  cyclobenzaprine 10 mg oral tablet: 1 tab(s) orally 3 times a day MDD:3  melatonin 3 mg oral tablet: 1 tab(s) orally once a day (at bedtime)  morphine 15 mg oral tablet: 1 tab(s) orally every 4 hours, As needed, Severe Pain (7 - 10) MDD:6  polyethylene glycol 3350 oral powder for reconstitution: 17 gram(s) orally once a day  Rolling Walker: S/P L5-S1 PLIF w/ICBG      senna oral tablet: 2 tab(s) orally once a day (at bedtime)

## 2022-01-13 NOTE — DISCHARGE NOTE PROVIDER - HOSPITAL COURSE
47 y/o female with chronic back pain  s/p multiple conservative management with no good improved followed by neurosurgery. Presents to [facility] for scheduled Left L4- L5   Facectomy, Decompression Laminectomy  l4-  5 ,l5 s1 lumbar posterior interbody Fusion on 1/10/22. She is s/p L5-S1 PLIF with left L4-5 hemilaminectomy foraminotomy and ICBG on 1/10. Her post op course  was uncomplicated. As per Dr. Nolan send her home with her hemovac which is to be removed in his office tomorrow. Patient verbalizes understanding of follow up appointment for drain removal in Dr. Nolan's office tomorrow. PT saw the patient and they recommend home with home PT (patient notes she has a private hire nurse and PT already at home). On the day of discharge patient is neurologically and medically stable and clear for discharge home.

## 2022-01-13 NOTE — DISCHARGE NOTE PROVIDER - NSDCFUADDINST_GEN_ALL_CORE_FT
please keep incision clean and dry, do not submerge wound in water for prolonged periods of time, pat dry after showering, and do not use any creams or ointments to incision.  NO heavy lifting, strenous activity, twisting, bending, driving, or working until cleared by your physician.  Please return to the emergency department if you develop changes in mental status, seizures, fainting, dizziness, changes in vision, lethargy, nausea, vomiting, chest pain, shortness of breathe or severe pain.

## 2022-10-01 NOTE — PHYSICAL THERAPY INITIAL EVALUATION ADULT - SHORT TERM MEMORY, REHAB EVAL
Verna MACHADO discussed options with Brendon. Pt made aware at this point his options are attempting [facility], [facility], or [facility] for emergency [facility]. Pt states he will call his insurance to see if maximum is $6000 or $[phone] for a [facility] stay. [facility] states [facility] but also does not want to be chaptered.   intact

## 2024-02-07 ENCOUNTER — EMERGENCY (EMERGENCY)
Facility: HOSPITAL | Age: 49
LOS: 1 days | Discharge: HOME | End: 2024-02-07
Attending: EMERGENCY MEDICINE | Admitting: EMERGENCY MEDICINE
Payer: COMMERCIAL

## 2024-02-07 VITALS
DIASTOLIC BLOOD PRESSURE: 85 MMHG | HEIGHT: 64 IN | RESPIRATION RATE: 19 BRPM | OXYGEN SATURATION: 100 % | TEMPERATURE: 98 F | WEIGHT: 136.03 LBS | SYSTOLIC BLOOD PRESSURE: 135 MMHG | HEART RATE: 89 BPM

## 2024-02-07 DIAGNOSIS — Z98.82 BREAST IMPLANT STATUS: Chronic | ICD-10-CM

## 2024-02-07 DIAGNOSIS — Z98.51 TUBAL LIGATION STATUS: Chronic | ICD-10-CM

## 2024-02-07 DIAGNOSIS — K00.9 DISORDER OF TOOTH DEVELOPMENT, UNSPECIFIED: Chronic | ICD-10-CM

## 2024-02-07 LAB
ALBUMIN SERPL ELPH-MCNC: 3.2 G/DL — LOW (ref 3.3–5)
ALP SERPL-CCNC: 40 U/L — SIGNIFICANT CHANGE UP (ref 40–120)
ALT FLD-CCNC: 13 U/L — SIGNIFICANT CHANGE UP (ref 10–45)
ANION GAP SERPL CALC-SCNC: 10 MMOL/L — SIGNIFICANT CHANGE UP (ref 5–17)
APPEARANCE UR: CLEAR — SIGNIFICANT CHANGE UP
AST SERPL-CCNC: 27 U/L — SIGNIFICANT CHANGE UP (ref 10–40)
BASOPHILS # BLD AUTO: 0.02 K/UL — SIGNIFICANT CHANGE UP (ref 0–0.2)
BASOPHILS NFR BLD AUTO: 0.3 % — SIGNIFICANT CHANGE UP (ref 0–2)
BILIRUB SERPL-MCNC: 0.2 MG/DL — SIGNIFICANT CHANGE UP (ref 0.2–1.2)
BILIRUB UR-MCNC: NEGATIVE — SIGNIFICANT CHANGE UP
BUN SERPL-MCNC: 14 MG/DL — SIGNIFICANT CHANGE UP (ref 7–23)
CALCIUM SERPL-MCNC: 8.9 MG/DL — SIGNIFICANT CHANGE UP (ref 8.4–10.5)
CHLORIDE SERPL-SCNC: 104 MMOL/L — SIGNIFICANT CHANGE UP (ref 96–108)
CO2 SERPL-SCNC: 24 MMOL/L — SIGNIFICANT CHANGE UP (ref 22–31)
COLOR SPEC: YELLOW — SIGNIFICANT CHANGE UP
CREAT SERPL-MCNC: 0.62 MG/DL — SIGNIFICANT CHANGE UP (ref 0.5–1.3)
DIFF PNL FLD: ABNORMAL
EGFR: 111 ML/MIN/1.73M2 — SIGNIFICANT CHANGE UP
EOSINOPHIL # BLD AUTO: 0.09 K/UL — SIGNIFICANT CHANGE UP (ref 0–0.5)
EOSINOPHIL NFR BLD AUTO: 1.1 % — SIGNIFICANT CHANGE UP (ref 0–6)
GLUCOSE SERPL-MCNC: 100 MG/DL — HIGH (ref 70–99)
GLUCOSE UR QL: NEGATIVE MG/DL — SIGNIFICANT CHANGE UP
HCG SERPL-ACNC: HIGH MIU/ML
HCT VFR BLD CALC: 30.4 % — LOW (ref 34.5–45)
HGB BLD-MCNC: 10.9 G/DL — LOW (ref 11.5–15.5)
IMM GRANULOCYTES NFR BLD AUTO: 0.3 % — SIGNIFICANT CHANGE UP (ref 0–0.9)
KETONES UR-MCNC: NEGATIVE MG/DL — SIGNIFICANT CHANGE UP
LEUKOCYTE ESTERASE UR-ACNC: NEGATIVE — SIGNIFICANT CHANGE UP
LYMPHOCYTES # BLD AUTO: 3.53 K/UL — HIGH (ref 1–3.3)
LYMPHOCYTES # BLD AUTO: 45.1 % — HIGH (ref 13–44)
MCHC RBC-ENTMCNC: 23.9 PG — LOW (ref 27–34)
MCHC RBC-ENTMCNC: 35.9 GM/DL — SIGNIFICANT CHANGE UP (ref 32–36)
MCV RBC AUTO: 66.7 FL — LOW (ref 80–100)
MONOCYTES # BLD AUTO: 0.73 K/UL — SIGNIFICANT CHANGE UP (ref 0–0.9)
MONOCYTES NFR BLD AUTO: 9.3 % — SIGNIFICANT CHANGE UP (ref 2–14)
NEUTROPHILS # BLD AUTO: 3.44 K/UL — SIGNIFICANT CHANGE UP (ref 1.8–7.4)
NEUTROPHILS NFR BLD AUTO: 43.9 % — SIGNIFICANT CHANGE UP (ref 43–77)
NITRITE UR-MCNC: NEGATIVE — SIGNIFICANT CHANGE UP
NRBC # BLD: 0 /100 WBCS — SIGNIFICANT CHANGE UP (ref 0–0)
PH UR: 6.5 — SIGNIFICANT CHANGE UP (ref 5–8)
PLATELET # BLD AUTO: 218 K/UL — SIGNIFICANT CHANGE UP (ref 150–400)
POTASSIUM SERPL-MCNC: 4.1 MMOL/L — SIGNIFICANT CHANGE UP (ref 3.5–5.3)
POTASSIUM SERPL-SCNC: 4.1 MMOL/L — SIGNIFICANT CHANGE UP (ref 3.5–5.3)
PROT SERPL-MCNC: 6.9 G/DL — SIGNIFICANT CHANGE UP (ref 6–8.3)
PROT UR-MCNC: NEGATIVE MG/DL — SIGNIFICANT CHANGE UP
RBC # BLD: 4.56 M/UL — SIGNIFICANT CHANGE UP (ref 3.8–5.2)
RBC # FLD: 13.8 % — SIGNIFICANT CHANGE UP (ref 10.3–14.5)
SODIUM SERPL-SCNC: 138 MMOL/L — SIGNIFICANT CHANGE UP (ref 135–145)
SP GR SPEC: 1 — SIGNIFICANT CHANGE UP (ref 1–1.03)
UROBILINOGEN FLD QL: 0.2 MG/DL — SIGNIFICANT CHANGE UP (ref 0.2–1)
WBC # BLD: 7.83 K/UL — SIGNIFICANT CHANGE UP (ref 3.8–10.5)
WBC # FLD AUTO: 7.83 K/UL — SIGNIFICANT CHANGE UP (ref 3.8–10.5)

## 2024-02-07 PROCEDURE — 99284 EMERGENCY DEPT VISIT MOD MDM: CPT

## 2024-02-07 PROCEDURE — 84702 CHORIONIC GONADOTROPIN TEST: CPT

## 2024-02-07 PROCEDURE — 85025 COMPLETE CBC W/AUTO DIFF WBC: CPT

## 2024-02-07 PROCEDURE — 86850 RBC ANTIBODY SCREEN: CPT

## 2024-02-07 PROCEDURE — 86900 BLOOD TYPING SEROLOGIC ABO: CPT

## 2024-02-07 PROCEDURE — 36415 COLL VENOUS BLD VENIPUNCTURE: CPT

## 2024-02-07 PROCEDURE — 80053 COMPREHEN METABOLIC PANEL: CPT

## 2024-02-07 PROCEDURE — 76817 TRANSVAGINAL US OBSTETRIC: CPT

## 2024-02-07 PROCEDURE — 87186 SC STD MICRODIL/AGAR DIL: CPT

## 2024-02-07 PROCEDURE — 87086 URINE CULTURE/COLONY COUNT: CPT

## 2024-02-07 PROCEDURE — 87077 CULTURE AEROBIC IDENTIFY: CPT

## 2024-02-07 PROCEDURE — 76817 TRANSVAGINAL US OBSTETRIC: CPT | Mod: 26

## 2024-02-07 PROCEDURE — 86901 BLOOD TYPING SEROLOGIC RH(D): CPT

## 2024-02-07 PROCEDURE — 81001 URINALYSIS AUTO W/SCOPE: CPT

## 2024-02-07 RX ORDER — SODIUM CHLORIDE 9 MG/ML
1000 INJECTION INTRAMUSCULAR; INTRAVENOUS; SUBCUTANEOUS ONCE
Refills: 0 | Status: COMPLETED | OUTPATIENT
Start: 2024-02-07 | End: 2024-02-07

## 2024-02-07 RX ADMIN — SODIUM CHLORIDE 1000 MILLILITER(S): 9 INJECTION INTRAMUSCULAR; INTRAVENOUS; SUBCUTANEOUS at 20:42

## 2024-02-07 NOTE — ED PROVIDER NOTE - NSFOLLOWUPINSTRUCTIONS_ED_ALL_ED_FT
-Follow-up with OB/GYN Dr. Gonzalez or your own OB/GYN doctor this week  -No sex until cleared by your OB/GYN  -Return for heavy bleeding, severe pains, feeling faint or other concerns    Threatened Miscarriage    WHAT YOU NEED TO KNOW:    A threatened miscarriage occurs when you have vaginal bleeding within the first 20 weeks of pregnancy. It means that a miscarriage may happen. A threatened miscarriage may also be called a threatened .    DISCHARGE INSTRUCTIONS:    Return to the emergency department if:    You feel weak or faint.    Your pain or cramping in your abdomen or back gets worse.    You have vaginal bleeding that soaks 1 or more pads in an hour.    You pass material that looks like tissue or large clots.  Call your doctor or obstetrician if:    You have a fever.    You have trouble urinating, burning when you urinate, or feel a need to urinate often.    You have new or worsening vaginal bleeding.    You have vaginal pain or itching, or vaginal discharge that is yellow, green, or foul-smelling.    You have questions or concerns about your condition or care.  Self-care: The following may help you manage your symptoms and decrease your risk for a miscarriage:    Do not put anything in your vagina. Do not have sex, douche, or use tampons. These actions may increase your risk for infection and miscarriage.    Rest as directed. Do not exercise or do strenuous activities. These activities may cause  labor or miscarriage. Ask your healthcare provider what activities are okay to do.  Stay healthy during pregnancy:    Eat a variety of healthy foods. Healthy foods can help you get extra protein, water, and calories that you need while you are pregnant. Healthy foods include fruits, vegetables, whole-grain breads, low-fat dairy products, beans, lean meats, and fish. Avoid raw or undercooked meat and fish. Ask your healthcare provider if you need a special diet.  Healthy Foods      Take prenatal vitamins as directed. These help you get the right amount of vitamins and minerals. They may also decrease the risk of certain birth defects.    Do not drink alcohol or use illegal drugs. These can increase your risk for a miscarriage or harm your baby.    Do not smoke. Nicotine and other chemicals in cigarettes and cigars can harm your baby and cause miscarriage or  labor. Ask your healthcare provider for information if you currently smoke and need help to quit. E-cigarettes or smokeless tobacco still contain nicotine. Do not use these products.    Decrease your risk for an infection. Always wash your hands before eating or preparing meals. Do not spend time with people who are sick. Ask your healthcare provider if you need immunizations such as the flu or hepatitis B vaccine. Immunizations may decrease your risk for infections that could cause a miscarriage.    Manage your medical conditions. Keep your blood pressure and blood sugars under control. Maintain a healthy weight during pregnancy.  Follow up with your doctor or obstetrician as directed: You may need to see your obstetrician frequently for ultrasounds or blood tests. Write down your questions so you remember to ask them during your visits.

## 2024-02-07 NOTE — ED ADULT NURSE NOTE - NSFALLUNIVINTERV_ED_ALL_ED
Bed/Stretcher in lowest position, wheels locked, appropriate side rails in place/Call bell, personal items and telephone in reach/Instruct patient to call for assistance before getting out of bed/chair/stretcher/Non-slip footwear applied when patient is off stretcher/[place] to call system/Physically safe environment - no spills, clutter or unnecessary equipment/Purposeful proactive rounding/Room/bathroom lighting operational, light cord in reach

## 2024-02-07 NOTE — ED ADULT NURSE NOTE - OBJECTIVE STATEMENT
Patient arrives A&Ox4, ambulatory with steady gait. Patient endorsing new onset of vaginal bleeding. Denies clotting or excessive bleeding at present. Patient states she is 7 weeks pregnant via IVF after multiple attempts. . 20G IV placed to RAC and labs drawn as ordered. Side rails up, call light in reach, safety maintained and MD evaluation in progress. Patient arrives A&Ox4, ambulatory with steady gait. Patient endorsing new onset of vaginal bleeding. Denies clots or excessive bleeding at present. Patient states she is 7 weeks pregnant via IVF after multiple attempts. . 20G IV placed to RAC and labs drawn as ordered. Side rails up, call light in reach, safety maintained and MD evaluation in progress.

## 2024-02-07 NOTE — ED PROVIDER NOTE - PHYSICAL EXAMINATION
exam:   General: well appearing, NAD.   HEENT: eyes perrl,   cor: RRR, s1s2, 2+rad pulses.   lungs: ctabl, no resp distress.   abd: soft, ntnd. no cvat  neuro: a&ox3, cn2-12 intact, ISAACS, 5/5 strength c nl sensation all extremities, nl coordination.   MSK: no extremity swelling.  Skin: normal, no rash

## 2024-02-07 NOTE — ED PROVIDER NOTE - OBJECTIVE STATEMENT
48-year-old female G4, P2, 7 and half weeks pregnant via IVF/donor egg complaining of vaginal bleeding, mild, brownish this morning and more red this evening.  Associated with mild pelvic cramps.  No vomiting or diarrhea.  No hematuria dysuria.  No fever or chills.  No abdominal trauma.

## 2024-02-07 NOTE — ED PROVIDER NOTE - PATIENT PORTAL LINK FT
You can access the FollowMyHealth Patient Portal offered by [facility] by registering at the following website: [email or web address]. By joining [employer]’s FollowMyHealth portal, you will also be able to view your health information using other applications (apps) compatible with our system.

## 2024-02-07 NOTE — ED PROVIDER NOTE - CLINICAL SUMMARY MEDICAL DECISION MAKING FREE TEXT BOX
48-year-old female G4, P2, 7 and half weeks pregnant via IVF/donor egg complaining of vaginal bleeding, mild, brownish this morning and more red this evening.  Associated with mild pelvic cramps.  No vomiting or diarrhea.  No hematuria dysuria.  No fever or chills.  No abdominal trauma.      Patient had IVF and reported sono last week showing IUP and FH, ectopic unlikely.  Will check sonogram, rule out fetal demise/miscarriage in progress.  Check type/Rh, H&H. 48-year-old female G4, P2, 7 and half weeks pregnant via IVF/donor egg complaining of vaginal bleeding, mild, brownish this morning and more red this evening.  Associated with mild pelvic cramps.  No vomiting or diarrhea.  No hematuria dysuria.  No fever or chills.  No abdominal trauma.      Patient had IVF and reported sono last week showing IUP and FH, ectopic unlikely.  Will check sonogram, rule out fetal demise/miscarriage in progress.  Check type/Rh, H&H.    Update: Labs unremarkable.  Sonogram shows live IUP with small subchorionic hemorrhage.  Results discussed with patient.  Advise follow-up with OB/GYN.  Pelvic rest.

## 2024-02-07 NOTE — ED ADULT TRIAGE NOTE - CHIEF COMPLAINT QUOTE
Patient BIB  for vaginal bleeding. Patient reports being 7 weeks pregnant with IVF with a donor egg. Patient has an unknown last menstrual period due to ongoing IVF treatment. Patient reports 1 miscarriage recently with 2 live births from 20 years prior.

## 2024-02-10 LAB
-  AMPICILLIN: SIGNIFICANT CHANGE UP
-  CIPROFLOXACIN: SIGNIFICANT CHANGE UP
-  LEVOFLOXACIN: SIGNIFICANT CHANGE UP
-  NITROFURANTOIN: SIGNIFICANT CHANGE UP
-  TETRACYCLINE: SIGNIFICANT CHANGE UP
-  VANCOMYCIN: SIGNIFICANT CHANGE UP
CULTURE RESULTS: ABNORMAL
METHOD TYPE: SIGNIFICANT CHANGE UP
ORGANISM # SPEC MICROSCOPIC CNT: ABNORMAL
ORGANISM # SPEC MICROSCOPIC CNT: SIGNIFICANT CHANGE UP
SPECIMEN SOURCE: SIGNIFICANT CHANGE UP

## 2024-02-10 RX ORDER — CEPHALEXIN 500 MG
1 CAPSULE ORAL
Qty: 28 | Refills: 0
Start: 2024-02-10 | End: 2024-02-16

## 2024-02-10 NOTE — POST DISCHARGE NOTE - NOTIFICATION:
urine culture returned- patient is pregnant, needs to be treated, called her phone, left message to Call back, sent keflex for her pharmacy in the record

## 2024-02-12 PROBLEM — N65.1 DISPROPORTION OF RECONSTRUCTED BREAST: Chronic | Status: ACTIVE | Noted: 2017-05-25

## 2024-02-20 RX ORDER — CELECOXIB 200 MG/1
1 CAPSULE ORAL
Qty: 0 | Refills: 0 | DISCHARGE

## 2024-02-28 ENCOUNTER — APPOINTMENT (OUTPATIENT)
Dept: OBGYN | Facility: CLINIC | Age: 49
End: 2024-02-28

## 2024-02-28 ENCOUNTER — APPOINTMENT (OUTPATIENT)
Dept: OBGYN | Facility: CLINIC | Age: 49
End: 2024-02-28
Payer: COMMERCIAL

## 2024-02-28 ENCOUNTER — ASOB RESULT (OUTPATIENT)
Age: 49
End: 2024-02-28

## 2024-02-28 VITALS
HEIGHT: 64.5 IN | HEART RATE: 84 BPM | DIASTOLIC BLOOD PRESSURE: 73 MMHG | WEIGHT: 146 LBS | SYSTOLIC BLOOD PRESSURE: 123 MMHG | BODY MASS INDEX: 24.62 KG/M2

## 2024-02-28 DIAGNOSIS — D58.2 OTHER HEMOGLOBINOPATHIES: ICD-10-CM

## 2024-02-28 DIAGNOSIS — D56.3 THALASSEMIA MINOR: ICD-10-CM

## 2024-02-28 DIAGNOSIS — N91.1 SECONDARY AMENORRHEA: ICD-10-CM

## 2024-02-28 DIAGNOSIS — Z32.01 ENCOUNTER FOR PREGNANCY TEST, RESULT POSITIVE: ICD-10-CM

## 2024-02-28 PROBLEM — M54.9 DORSALGIA, UNSPECIFIED: Chronic | Status: ACTIVE | Noted: 2021-12-13

## 2024-02-28 PROBLEM — D57.3 SICKLE-CELL TRAIT: Chronic | Status: ACTIVE | Noted: 2021-12-13

## 2024-02-28 PROCEDURE — 99459 PELVIC EXAMINATION: CPT

## 2024-02-28 PROCEDURE — 76817 TRANSVAGINAL US OBSTETRIC: CPT

## 2024-02-28 PROCEDURE — 99203 OFFICE O/P NEW LOW 30 MIN: CPT

## 2024-02-28 RX ORDER — PROGESTERONE 200 MG/1
CAPSULE ORAL
Refills: 0 | Status: ACTIVE | COMMUNITY

## 2024-03-14 ENCOUNTER — ASOB RESULT (OUTPATIENT)
Age: 49
End: 2024-03-14

## 2024-03-14 ENCOUNTER — APPOINTMENT (OUTPATIENT)
Dept: ANTEPARTUM | Facility: CLINIC | Age: 49
End: 2024-03-14
Payer: COMMERCIAL

## 2024-03-14 PROCEDURE — 76813 OB US NUCHAL MEAS 1 GEST: CPT

## 2024-03-14 PROCEDURE — 76801 OB US < 14 WKS SINGLE FETUS: CPT

## 2024-03-15 ENCOUNTER — TRANSCRIPTION ENCOUNTER (OUTPATIENT)
Age: 49
End: 2024-03-15

## 2024-03-17 PROBLEM — D58.2 HEMOGLOBIN C TRAIT: Status: ACTIVE | Noted: 2017-01-24

## 2024-03-17 PROBLEM — D56.3 ALPHA THALASSEMIA TRAIT: Status: RESOLVED | Noted: 2024-03-17 | Resolved: 2024-03-17

## 2024-03-17 PROBLEM — N91.1 AMENORRHEA, SECONDARY: Status: ACTIVE | Noted: 2024-03-17

## 2024-03-17 LAB
ABO + RH PNL BLD: NORMAL
ALBUMIN SERPL ELPH-MCNC: 4.1 G/DL
ALP BLD-CCNC: 65 U/L
ALT SERPL-CCNC: 13 U/L
ANION GAP SERPL CALC-SCNC: 12 MMOL/L
AST SERPL-CCNC: 15 U/L
B19V IGG SER QL IA: 2.31 INDEX
B19V IGG+IGM SER-IMP: NORMAL
B19V IGG+IGM SER-IMP: POSITIVE
B19V IGM FLD-ACNC: 0.23 INDEX
B19V IGM SER-ACNC: NEGATIVE
BACTERIA UR CULT: NORMAL
BILIRUB SERPL-MCNC: <0.2 MG/DL
BLD GP AB SCN SERPL QL: NORMAL
BUN SERPL-MCNC: 14 MG/DL
C TRACH RRNA SPEC QL NAA+PROBE: NOT DETECTED
CALCIUM SERPL-MCNC: 9.6 MG/DL
CHLORIDE SERPL-SCNC: 103 MMOL/L
CO2 SERPL-SCNC: 22 MMOL/L
CREAT SERPL-MCNC: 0.61 MG/DL
EGFR: 112 ML/MIN/1.73M2
ESTIMATED AVERAGE GLUCOSE: 108 MG/DL
GLUCOSE SERPL-MCNC: 65 MG/DL
HBA1C MFR BLD HPLC: 5.4 %
HBV SURFACE AG SER QL: NONREACTIVE
HCT VFR BLD CALC: 34 %
HCV AB SER QL: NONREACTIVE
HCV S/CO RATIO: 0.12 S/CO
HGB BLD-MCNC: 11.3 G/DL
HIV1+2 AB SPEC QL IA.RAPID: NONREACTIVE
LEAD BLD-MCNC: <1 UG/DL
MCHC RBC-ENTMCNC: 23.3 PG
MCHC RBC-ENTMCNC: 33.2 GM/DL
MCV RBC AUTO: 70.1 FL
N GONORRHOEA RRNA SPEC QL NAA+PROBE: NOT DETECTED
PLATELET # BLD AUTO: 359 K/UL
POTASSIUM SERPL-SCNC: 4.6 MMOL/L
PROT SERPL-MCNC: 6.9 G/DL
RBC # BLD: 4.85 M/UL
RBC # FLD: 15.9 %
RUBV IGG FLD-ACNC: 16.4 INDEX
RUBV IGG SER-IMP: POSITIVE
SODIUM SERPL-SCNC: 137 MMOL/L
SOURCE AMPLIFICATION: NORMAL
T PALLIDUM AB SER QL IA: NEGATIVE
TSH SERPL-ACNC: 0.45 UIU/ML
WBC # FLD AUTO: 12.12 K/UL

## 2024-03-17 NOTE — PHYSICAL EXAM
[Chaperone Present] : A chaperone was present in the examining room during all aspects of the physical examination [Labia Majora] : normal [Labia Minora] : normal [Uterine Adnexae] : normal [FreeTextEntry1] : Fawn SHAH [Alert] : alert [Appropriately responsive] : appropriately responsive [No Acute Distress] : no acute distress [Regular Rate Rhythm] : regular rate rhythm [No Lymphadenopathy] : no lymphadenopathy [Clear to Auscultation B/L] : clear to auscultation bilaterally [No Murmurs] : no murmurs [Soft] : soft [Non-tender] : non-tender [No HSM] : No HSM [Non-distended] : non-distended [No Lesions] : no lesions [No Mass] : no mass [Oriented x3] : oriented x3 [Examination Of The Breasts] : a normal appearance [Normal] : normal [No Masses] : no breast masses were palpable

## 2024-03-17 NOTE — HISTORY OF PRESENT ILLNESS
[FreeTextEntry1] : Brenda Berrios is a 48 year old patient P2 LMP 2023 presenting as new patient to establish care.   Pt feels well and has no complaints. Pt has gone through multiple sessions of IVF. Pt is currently pregnant with a donor egg. Pt was given MARISA of 08/22/24. Pt has alpha thalassemia hemoglobin C trait. Pt currently has a sub-chronic hematoma with no bleeding.

## 2024-03-17 NOTE — END OF VISIT
[FreeTextEntry4] : I, Laurie Young, acted as a scribe on behalf of Dr. Alessandra Tong on 02/28/2024 .All medical record entries made by the Scribe, Laurie Young, were at the direction and personally dictated by the attending physician. I, the attending physician, have reviewed the chart and agree that the record accurately reflects my personal performance of the history, physical exam, assessment and plan. I have also personally directed, reviewed, and agreed with the chart.  All medical entries made by the scribe were at my, Dr. Alessandra Tong, direction and personally dictated by me on 02/28/2024 .. I have reviewed the chart and agree that the record accurately reflects my personal performance of the history, physical exam, assessment and plan. I have also personally directed, reviewed, and agreed with the chart.

## 2024-03-17 NOTE — PLAN
[FreeTextEntry1] : - Pt given practice letter and [facility] pregnancy booklet. The group's support staff and coverage discussed. - Discussed bASA; Pt advised to start taking bASA at 12 weeks. - Reviewed prenatal screening including amino, cvs, NIPS, and nuchal. - Referral given for nuchal trans-sono. - Routine new OB blood work today. - RTO 4 weeks  Single live intrauterine pregnancy

## 2024-03-18 ENCOUNTER — TRANSCRIPTION ENCOUNTER (OUTPATIENT)
Age: 49
End: 2024-03-18

## 2024-04-11 ENCOUNTER — APPOINTMENT (OUTPATIENT)
Dept: OBGYN | Facility: CLINIC | Age: 49
End: 2024-04-11
Payer: COMMERCIAL

## 2024-04-11 VITALS
BODY MASS INDEX: 25.97 KG/M2 | DIASTOLIC BLOOD PRESSURE: 75 MMHG | HEIGHT: 64.5 IN | HEART RATE: 74 BPM | WEIGHT: 154 LBS | SYSTOLIC BLOOD PRESSURE: 120 MMHG

## 2024-04-11 PROCEDURE — 0500F INITIAL PRENATAL CARE VISIT: CPT

## 2024-04-22 LAB
AFP MOM: 0.96
AFP VALUE: 38.4 NG/ML
ALPHA FETOPROTEIN SERUM COMMENT: NORMAL
ALPHA FETOPROTEIN SERUM INTERPRETATION: NORMAL
ALPHA FETOPROTEIN SERUM RESULTS: NORMAL
ALPHA FETOPROTEIN SERUM TEST RESULTS: NORMAL
BACTERIA UR CULT: NORMAL
GESTATIONAL AGE BASED ON: NORMAL
GESTATIONAL AGE ON COLLECTION DATE: 16.7 WEEKS
INSULIN DEP DIABETES: NO
MATERNAL AGE AT EDD AFP: 46.8 YR
MULTIPLE GESTATION: NO
OSBR RISK 1 IN: NORMAL
RACE: NORMAL
WEIGHT AFP: 154 LBS

## 2024-05-06 ENCOUNTER — ASOB RESULT (OUTPATIENT)
Age: 49
End: 2024-05-06

## 2024-05-06 ENCOUNTER — APPOINTMENT (OUTPATIENT)
Dept: ANTEPARTUM | Facility: CLINIC | Age: 49
End: 2024-05-06
Payer: COMMERCIAL

## 2024-05-06 PROCEDURE — 76811 OB US DETAILED SNGL FETUS: CPT

## 2024-05-09 ENCOUNTER — APPOINTMENT (OUTPATIENT)
Dept: OBGYN | Facility: CLINIC | Age: 49
End: 2024-05-09

## 2024-05-09 ENCOUNTER — NON-APPOINTMENT (OUTPATIENT)
Age: 49
End: 2024-05-09

## 2024-05-09 VITALS
DIASTOLIC BLOOD PRESSURE: 80 MMHG | BODY MASS INDEX: 27.15 KG/M2 | HEART RATE: 77 BPM | SYSTOLIC BLOOD PRESSURE: 115 MMHG | WEIGHT: 161 LBS | HEIGHT: 64.5 IN

## 2024-05-09 PROCEDURE — 0502F SUBSEQUENT PRENATAL CARE: CPT

## 2024-05-11 LAB — BACTERIA UR CULT: NORMAL

## 2024-05-13 ENCOUNTER — APPOINTMENT (OUTPATIENT)
Dept: ANTEPARTUM | Facility: CLINIC | Age: 49
End: 2024-05-13
Payer: COMMERCIAL

## 2024-05-13 ENCOUNTER — ASOB RESULT (OUTPATIENT)
Age: 49
End: 2024-05-13

## 2024-05-13 PROCEDURE — 76825 ECHO EXAM OF FETAL HEART: CPT

## 2024-05-13 PROCEDURE — 93325 DOPPLER ECHO COLOR FLOW MAPG: CPT

## 2024-05-13 PROCEDURE — 76827 ECHO EXAM OF FETAL HEART: CPT

## 2024-06-13 ENCOUNTER — APPOINTMENT (OUTPATIENT)
Dept: ANTEPARTUM | Facility: CLINIC | Age: 49
End: 2024-06-13
Payer: COMMERCIAL

## 2024-06-13 ENCOUNTER — APPOINTMENT (OUTPATIENT)
Dept: OBGYN | Facility: CLINIC | Age: 49
End: 2024-06-13

## 2024-06-13 ENCOUNTER — ASOB RESULT (OUTPATIENT)
Age: 49
End: 2024-06-13

## 2024-06-13 VITALS
HEIGHT: 64.5 IN | HEART RATE: 79 BPM | SYSTOLIC BLOOD PRESSURE: 125 MMHG | BODY MASS INDEX: 30.02 KG/M2 | WEIGHT: 178 LBS | DIASTOLIC BLOOD PRESSURE: 74 MMHG

## 2024-06-13 DIAGNOSIS — O09.522 SUPERVISION OF ELDERLY MULTIGRAVIDA, SECOND TRIMESTER: ICD-10-CM

## 2024-06-13 LAB — GLUCOSE 1H P 50 G GLC PO SERPL-MCNC: 134 MG/DL

## 2024-06-13 PROCEDURE — 0502F SUBSEQUENT PRENATAL CARE: CPT

## 2024-06-13 PROCEDURE — 76816 OB US FOLLOW-UP PER FETUS: CPT

## 2024-06-14 LAB
BACTERIA UR CULT: NORMAL
HCT VFR BLD CALC: 33.8 %
HGB BLD-MCNC: 11.6 G/DL
MCHC RBC-ENTMCNC: 24.7 PG
MCHC RBC-ENTMCNC: 34.3 GM/DL
MCV RBC AUTO: 71.9 FL
PLATELET # BLD AUTO: 255 K/UL
RBC # BLD: 4.7 M/UL
RBC # FLD: 15.5 %
WBC # FLD AUTO: 11.49 K/UL

## 2024-06-20 NOTE — ASU PREOP CHECKLIST - BMI (KG/M2)
Caller: Mikala Scruggs    Relationship: Self    Best call back number: [phone]    What medication are you requesting: WEGOVY 0.5    What are your current symptoms: WEIGHT MANAGEMENT    How long have you been experiencing symptoms:     Have you had these symptoms before:    [x] Yes  [] No    Have you been treated for these symptoms before:   [x] Yes  [] No    If a prescription is needed, what is your preferred pharmacy and phone number: [id number] - [facility] [place] [address] [phone] [facility] [phone]      Additional notes:         24.9

## 2024-07-01 ENCOUNTER — TRANSCRIPTION ENCOUNTER (OUTPATIENT)
Age: 49
End: 2024-07-01

## 2024-07-11 ENCOUNTER — APPOINTMENT (OUTPATIENT)
Dept: OBGYN | Facility: CLINIC | Age: 49
End: 2024-07-11
Payer: COMMERCIAL

## 2024-07-11 ENCOUNTER — ASOB RESULT (OUTPATIENT)
Age: 49
End: 2024-07-11

## 2024-07-11 ENCOUNTER — APPOINTMENT (OUTPATIENT)
Dept: ANTEPARTUM | Facility: CLINIC | Age: 49
End: 2024-07-11
Payer: COMMERCIAL

## 2024-07-11 VITALS
HEART RATE: 108 BPM | WEIGHT: 179 LBS | DIASTOLIC BLOOD PRESSURE: 82 MMHG | BODY MASS INDEX: 30.19 KG/M2 | SYSTOLIC BLOOD PRESSURE: 131 MMHG | HEIGHT: 64.5 IN

## 2024-07-11 PROCEDURE — 76819 FETAL BIOPHYS PROFIL W/O NST: CPT | Mod: 59

## 2024-07-11 PROCEDURE — 90715 TDAP VACCINE 7 YRS/> IM: CPT

## 2024-07-11 PROCEDURE — 90471 IMMUNIZATION ADMIN: CPT

## 2024-07-11 PROCEDURE — 0502F SUBSEQUENT PRENATAL CARE: CPT

## 2024-07-11 PROCEDURE — 76816 OB US FOLLOW-UP PER FETUS: CPT

## 2024-07-11 PROCEDURE — 76820 UMBILICAL ARTERY ECHO: CPT | Mod: 59

## 2024-07-14 LAB — BACTERIA UR CULT: NORMAL

## 2024-07-23 ENCOUNTER — NON-APPOINTMENT (OUTPATIENT)
Age: 49
End: 2024-07-23

## 2024-07-23 ENCOUNTER — APPOINTMENT (OUTPATIENT)
Dept: OBGYN | Facility: CLINIC | Age: 49
End: 2024-07-23
Payer: COMMERCIAL

## 2024-07-23 VITALS
HEART RATE: 76 BPM | DIASTOLIC BLOOD PRESSURE: 76 MMHG | HEIGHT: 64 IN | BODY MASS INDEX: 30.39 KG/M2 | SYSTOLIC BLOOD PRESSURE: 127 MMHG | WEIGHT: 178 LBS

## 2024-07-23 DIAGNOSIS — O09.523 SUPERVISION OF ELDERLY MULTIGRAVIDA, THIRD TRIMESTER: ICD-10-CM

## 2024-07-23 PROCEDURE — 0502F SUBSEQUENT PRENATAL CARE: CPT

## 2024-08-08 ENCOUNTER — APPOINTMENT (OUTPATIENT)
Dept: ANTEPARTUM | Facility: CLINIC | Age: 49
End: 2024-08-08

## 2024-08-08 ENCOUNTER — ASOB RESULT (OUTPATIENT)
Age: 49
End: 2024-08-08

## 2024-08-08 ENCOUNTER — APPOINTMENT (OUTPATIENT)
Dept: OBGYN | Facility: CLINIC | Age: 49
End: 2024-08-08

## 2024-08-08 PROCEDURE — 0502F SUBSEQUENT PRENATAL CARE: CPT

## 2024-08-08 PROCEDURE — 76816 OB US FOLLOW-UP PER FETUS: CPT

## 2024-08-08 PROCEDURE — 76820 UMBILICAL ARTERY ECHO: CPT | Mod: 59

## 2024-08-08 PROCEDURE — 76819 FETAL BIOPHYS PROFIL W/O NST: CPT | Mod: 59

## 2024-08-20 ENCOUNTER — APPOINTMENT (OUTPATIENT)
Dept: OBGYN | Facility: CLINIC | Age: 49
End: 2024-08-20

## 2024-08-20 VITALS
HEIGHT: 64 IN | HEART RATE: 93 BPM | BODY MASS INDEX: 31.92 KG/M2 | DIASTOLIC BLOOD PRESSURE: 86 MMHG | WEIGHT: 187 LBS | SYSTOLIC BLOOD PRESSURE: 127 MMHG

## 2024-08-20 PROCEDURE — 0502F SUBSEQUENT PRENATAL CARE: CPT

## 2024-08-29 ENCOUNTER — ASOB RESULT (OUTPATIENT)
Age: 49
End: 2024-08-29

## 2024-08-29 ENCOUNTER — APPOINTMENT (OUTPATIENT)
Dept: ANTEPARTUM | Facility: CLINIC | Age: 49
End: 2024-08-29

## 2024-08-29 ENCOUNTER — APPOINTMENT (OUTPATIENT)
Dept: OBGYN | Facility: CLINIC | Age: 49
End: 2024-08-29

## 2024-08-29 ENCOUNTER — APPOINTMENT (OUTPATIENT)
Dept: MATERNAL FETAL MEDICINE | Facility: CLINIC | Age: 49
End: 2024-08-29

## 2024-08-29 VITALS
HEART RATE: 79 BPM | BODY MASS INDEX: 31.92 KG/M2 | SYSTOLIC BLOOD PRESSURE: 121 MMHG | HEIGHT: 64 IN | DIASTOLIC BLOOD PRESSURE: 78 MMHG | WEIGHT: 187 LBS

## 2024-08-29 LAB
GP B STREP DNA SPEC QL NAA+PROBE: NOT DETECTED
HIV1+2 AB SPEC QL IA.RAPID: NONREACTIVE
SOURCE GBS: NORMAL
T PALLIDUM AB SER QL IA: NEGATIVE

## 2024-08-29 PROCEDURE — 76816 OB US FOLLOW-UP PER FETUS: CPT

## 2024-08-29 PROCEDURE — 99204 OFFICE O/P NEW MOD 45 MIN: CPT | Mod: 25

## 2024-08-29 PROCEDURE — 76819 FETAL BIOPHYS PROFIL W/O NST: CPT | Mod: 59

## 2024-08-29 PROCEDURE — 0502F SUBSEQUENT PRENATAL CARE: CPT

## 2024-09-03 ENCOUNTER — APPOINTMENT (OUTPATIENT)
Dept: MATERNAL FETAL MEDICINE | Facility: CLINIC | Age: 49
End: 2024-09-03
Payer: COMMERCIAL

## 2024-09-03 ENCOUNTER — ASOB RESULT (OUTPATIENT)
Age: 49
End: 2024-09-03

## 2024-09-03 PROCEDURE — 99204 OFFICE O/P NEW MOD 45 MIN: CPT | Mod: 95

## 2024-09-05 ENCOUNTER — APPOINTMENT (OUTPATIENT)
Dept: ANTEPARTUM | Facility: CLINIC | Age: 49
End: 2024-09-05
Payer: COMMERCIAL

## 2024-09-05 ENCOUNTER — NON-APPOINTMENT (OUTPATIENT)
Age: 49
End: 2024-09-05

## 2024-09-05 ENCOUNTER — ASOB RESULT (OUTPATIENT)
Age: 49
End: 2024-09-05

## 2024-09-05 ENCOUNTER — APPOINTMENT (OUTPATIENT)
Dept: OBGYN | Facility: CLINIC | Age: 49
End: 2024-09-05

## 2024-09-05 VITALS
BODY MASS INDEX: 32.27 KG/M2 | HEIGHT: 64 IN | HEART RATE: 105 BPM | DIASTOLIC BLOOD PRESSURE: 77 MMHG | WEIGHT: 189 LBS | SYSTOLIC BLOOD PRESSURE: 130 MMHG

## 2024-09-05 PROCEDURE — 99213 OFFICE O/P EST LOW 20 MIN: CPT | Mod: 25

## 2024-09-05 PROCEDURE — 0502F SUBSEQUENT PRENATAL CARE: CPT

## 2024-09-05 PROCEDURE — 76819 FETAL BIOPHYS PROFIL W/O NST: CPT

## 2024-09-07 LAB — BACTERIA UR CULT: NORMAL

## 2024-09-12 ENCOUNTER — APPOINTMENT (OUTPATIENT)
Dept: ANTEPARTUM | Facility: CLINIC | Age: 49
End: 2024-09-12

## 2024-09-12 ENCOUNTER — APPOINTMENT (OUTPATIENT)
Dept: OBGYN | Facility: CLINIC | Age: 49
End: 2024-09-12

## 2024-09-12 ENCOUNTER — ASOB RESULT (OUTPATIENT)
Age: 49
End: 2024-09-12

## 2024-09-12 VITALS
DIASTOLIC BLOOD PRESSURE: 90 MMHG | HEIGHT: 64 IN | BODY MASS INDEX: 32.95 KG/M2 | WEIGHT: 193 LBS | HEART RATE: 111 BPM | SYSTOLIC BLOOD PRESSURE: 137 MMHG

## 2024-09-12 VITALS — DIASTOLIC BLOOD PRESSURE: 85 MMHG | SYSTOLIC BLOOD PRESSURE: 135 MMHG

## 2024-09-12 DIAGNOSIS — O09.523 SUPERVISION OF ELDERLY MULTIGRAVIDA, THIRD TRIMESTER: ICD-10-CM

## 2024-09-12 PROCEDURE — 0502F SUBSEQUENT PRENATAL CARE: CPT

## 2024-09-12 PROCEDURE — 76818 FETAL BIOPHYS PROFILE W/NST: CPT

## 2024-09-15 ENCOUNTER — INPATIENT (INPATIENT)
Facility: HOSPITAL | Age: 49
LOS: 2 days | Discharge: HOME | End: 2024-09-18
Attending: OBSTETRICS & GYNECOLOGY | Admitting: OBSTETRICS & GYNECOLOGY
Payer: COMMERCIAL

## 2024-09-15 VITALS — RESPIRATION RATE: 17 BRPM | TEMPERATURE: 98 F

## 2024-09-15 DIAGNOSIS — K00.9 DISORDER OF TOOTH DEVELOPMENT, UNSPECIFIED: Chronic | ICD-10-CM

## 2024-09-15 DIAGNOSIS — Z98.82 BREAST IMPLANT STATUS: Chronic | ICD-10-CM

## 2024-09-15 DIAGNOSIS — Z98.51 TUBAL LIGATION STATUS: Chronic | ICD-10-CM

## 2024-09-15 DIAGNOSIS — Z98.890 OTHER SPECIFIED POSTPROCEDURAL STATES: Chronic | ICD-10-CM

## 2024-09-15 DIAGNOSIS — O09.523 SUPERVISION OF ELDERLY MULTIGRAVIDA, THIRD TRIMESTER: ICD-10-CM

## 2024-09-15 LAB
ALBUMIN SERPL ELPH-MCNC: 3.5 G/DL — SIGNIFICANT CHANGE UP (ref 3.3–5)
ALP SERPL-CCNC: 191 U/L — HIGH (ref 40–120)
ALT FLD-CCNC: 11 U/L — SIGNIFICANT CHANGE UP (ref 4–33)
ANION GAP SERPL CALC-SCNC: 14 MMOL/L — SIGNIFICANT CHANGE UP (ref 7–14)
APPEARANCE UR: ABNORMAL
AST SERPL-CCNC: 18 U/L — SIGNIFICANT CHANGE UP (ref 4–32)
BACTERIA # UR AUTO: NEGATIVE /HPF — SIGNIFICANT CHANGE UP
BASOPHILS # BLD AUTO: 0.04 K/UL — SIGNIFICANT CHANGE UP (ref 0–0.2)
BASOPHILS # BLD AUTO: 0.05 K/UL — SIGNIFICANT CHANGE UP (ref 0–0.2)
BASOPHILS NFR BLD AUTO: 0.4 % — SIGNIFICANT CHANGE UP (ref 0–2)
BASOPHILS NFR BLD AUTO: 0.5 % — SIGNIFICANT CHANGE UP (ref 0–2)
BILIRUB SERPL-MCNC: <0.2 MG/DL — SIGNIFICANT CHANGE UP (ref 0.2–1.2)
BILIRUB UR-MCNC: NEGATIVE — SIGNIFICANT CHANGE UP
BLD GP AB SCN SERPL QL: NEGATIVE — SIGNIFICANT CHANGE UP
BUN SERPL-MCNC: 11 MG/DL — SIGNIFICANT CHANGE UP (ref 7–23)
CALCIUM SERPL-MCNC: 9.5 MG/DL — SIGNIFICANT CHANGE UP (ref 8.4–10.5)
CAST: 0 /LPF — SIGNIFICANT CHANGE UP (ref 0–4)
CHLORIDE SERPL-SCNC: 103 MMOL/L — SIGNIFICANT CHANGE UP (ref 98–107)
CO2 SERPL-SCNC: 20 MMOL/L — LOW (ref 22–31)
COLOR SPEC: YELLOW — SIGNIFICANT CHANGE UP
CREAT ?TM UR-MCNC: 178 MG/DL — SIGNIFICANT CHANGE UP
CREAT SERPL-MCNC: 0.6 MG/DL — SIGNIFICANT CHANGE UP (ref 0.5–1.3)
DIFF PNL FLD: ABNORMAL
EGFR: 112 ML/MIN/1.73M2 — SIGNIFICANT CHANGE UP
EOSINOPHIL # BLD AUTO: 0.17 K/UL — SIGNIFICANT CHANGE UP (ref 0–0.5)
EOSINOPHIL # BLD AUTO: 0.19 K/UL — SIGNIFICANT CHANGE UP (ref 0–0.5)
EOSINOPHIL NFR BLD AUTO: 1.6 % — SIGNIFICANT CHANGE UP (ref 0–6)
EOSINOPHIL NFR BLD AUTO: 1.8 % — SIGNIFICANT CHANGE UP (ref 0–6)
GLUCOSE SERPL-MCNC: 78 MG/DL — SIGNIFICANT CHANGE UP (ref 70–99)
GLUCOSE UR QL: NEGATIVE MG/DL — SIGNIFICANT CHANGE UP
HCT VFR BLD CALC: 33.8 % — LOW (ref 34.5–45)
HCT VFR BLD CALC: 34.6 % — SIGNIFICANT CHANGE UP (ref 34.5–45)
HGB BLD-MCNC: 11.9 G/DL — SIGNIFICANT CHANGE UP (ref 11.5–15.5)
HGB BLD-MCNC: 12.5 G/DL — SIGNIFICANT CHANGE UP (ref 11.5–15.5)
IANC: 5.86 K/UL — SIGNIFICANT CHANGE UP (ref 1.8–7.4)
IANC: 6.59 K/UL — SIGNIFICANT CHANGE UP (ref 1.8–7.4)
IMM GRANULOCYTES NFR BLD AUTO: 1.5 % — HIGH (ref 0–0.9)
IMM GRANULOCYTES NFR BLD AUTO: 2.1 % — HIGH (ref 0–0.9)
KETONES UR-MCNC: NEGATIVE MG/DL — SIGNIFICANT CHANGE UP
LDH SERPL L TO P-CCNC: 186 U/L — SIGNIFICANT CHANGE UP (ref 135–225)
LEUKOCYTE ESTERASE UR-ACNC: NEGATIVE — SIGNIFICANT CHANGE UP
LYMPHOCYTES # BLD AUTO: 2.21 K/UL — SIGNIFICANT CHANGE UP (ref 1–3.3)
LYMPHOCYTES # BLD AUTO: 2.88 K/UL — SIGNIFICANT CHANGE UP (ref 1–3.3)
LYMPHOCYTES # BLD AUTO: 21.3 % — SIGNIFICANT CHANGE UP (ref 13–44)
LYMPHOCYTES # BLD AUTO: 26.7 % — SIGNIFICANT CHANGE UP (ref 13–44)
MCHC RBC-ENTMCNC: 24.7 PG — LOW (ref 27–34)
MCHC RBC-ENTMCNC: 25.4 PG — LOW (ref 27–34)
MCHC RBC-ENTMCNC: 35.2 GM/DL — SIGNIFICANT CHANGE UP (ref 32–36)
MCHC RBC-ENTMCNC: 36.1 GM/DL — HIGH (ref 32–36)
MCV RBC AUTO: 70.2 FL — LOW (ref 80–100)
MCV RBC AUTO: 70.3 FL — LOW (ref 80–100)
MONOCYTES # BLD AUTO: 1.2 K/UL — HIGH (ref 0–0.9)
MONOCYTES # BLD AUTO: 1.57 K/UL — HIGH (ref 0–0.9)
MONOCYTES NFR BLD AUTO: 11.6 % — SIGNIFICANT CHANGE UP (ref 2–14)
MONOCYTES NFR BLD AUTO: 14.6 % — HIGH (ref 2–14)
NEUTROPHILS # BLD AUTO: 5.86 K/UL — SIGNIFICANT CHANGE UP (ref 1.8–7.4)
NEUTROPHILS # BLD AUTO: 6.59 K/UL — SIGNIFICANT CHANGE UP (ref 1.8–7.4)
NEUTROPHILS NFR BLD AUTO: 54.4 % — SIGNIFICANT CHANGE UP (ref 43–77)
NEUTROPHILS NFR BLD AUTO: 63.5 % — SIGNIFICANT CHANGE UP (ref 43–77)
NITRITE UR-MCNC: NEGATIVE — SIGNIFICANT CHANGE UP
NRBC # BLD: 0 /100 WBCS — SIGNIFICANT CHANGE UP (ref 0–0)
NRBC # BLD: 0 /100 WBCS — SIGNIFICANT CHANGE UP (ref 0–0)
NRBC # FLD: 0 K/UL — SIGNIFICANT CHANGE UP (ref 0–0)
NRBC # FLD: 0 K/UL — SIGNIFICANT CHANGE UP (ref 0–0)
PH UR: 6 — SIGNIFICANT CHANGE UP (ref 5–8)
PLATELET # BLD AUTO: 249 K/UL — SIGNIFICANT CHANGE UP (ref 150–400)
PLATELET # BLD AUTO: 249 K/UL — SIGNIFICANT CHANGE UP (ref 150–400)
POTASSIUM SERPL-MCNC: 4.4 MMOL/L — SIGNIFICANT CHANGE UP (ref 3.5–5.3)
POTASSIUM SERPL-SCNC: 4.4 MMOL/L — SIGNIFICANT CHANGE UP (ref 3.5–5.3)
PROT ?TM UR-MCNC: 26 MG/DL — SIGNIFICANT CHANGE UP
PROT SERPL-MCNC: 6.7 G/DL — SIGNIFICANT CHANGE UP (ref 6–8.3)
PROT UR-MCNC: 30 MG/DL
PROT/CREAT UR-RTO: 0.1 RATIO — SIGNIFICANT CHANGE UP (ref 0–0.2)
RBC # BLD: 4.81 M/UL — SIGNIFICANT CHANGE UP (ref 3.8–5.2)
RBC # BLD: 4.93 M/UL — SIGNIFICANT CHANGE UP (ref 3.8–5.2)
RBC # FLD: 13.5 % — SIGNIFICANT CHANGE UP (ref 10.3–14.5)
RBC # FLD: 13.8 % — SIGNIFICANT CHANGE UP (ref 10.3–14.5)
RBC CASTS # UR COMP ASSIST: 32 /HPF — HIGH (ref 0–4)
REVIEW: SIGNIFICANT CHANGE UP
RH IG SCN BLD-IMP: POSITIVE — SIGNIFICANT CHANGE UP
RH IG SCN BLD-IMP: POSITIVE — SIGNIFICANT CHANGE UP
SODIUM SERPL-SCNC: 137 MMOL/L — SIGNIFICANT CHANGE UP (ref 135–145)
SP GR SPEC: 1.03 — SIGNIFICANT CHANGE UP (ref 1–1.03)
SQUAMOUS # UR AUTO: 2 /HPF — SIGNIFICANT CHANGE UP (ref 0–5)
URATE SERPL-MCNC: 4 MG/DL — SIGNIFICANT CHANGE UP (ref 2.5–7)
UROBILINOGEN FLD QL: 1 MG/DL — SIGNIFICANT CHANGE UP (ref 0.2–1)
WBC # BLD: 10.38 K/UL — SIGNIFICANT CHANGE UP (ref 3.8–10.5)
WBC # BLD: 10.77 K/UL — HIGH (ref 3.8–10.5)
WBC # FLD AUTO: 10.38 K/UL — SIGNIFICANT CHANGE UP (ref 3.8–10.5)
WBC # FLD AUTO: 10.77 K/UL — HIGH (ref 3.8–10.5)
WBC UR QL: 0 /HPF — SIGNIFICANT CHANGE UP (ref 0–5)

## 2024-09-15 RX ORDER — SODIUM CITRATE AND CITRIC ACID MONOHYDRATE 334; 500 MG/5ML; MG/5ML
15 SOLUTION ORAL EVERY 6 HOURS
Refills: 0 | Status: DISCONTINUED | OUTPATIENT
Start: 2024-09-15 | End: 2024-09-17

## 2024-09-15 RX ORDER — OXYTOCIN 10 UNIT/ML
167 AMPUL (ML) INJECTION
Qty: 30 | Refills: 0 | Status: DISCONTINUED | OUTPATIENT
Start: 2024-09-15 | End: 2024-09-16

## 2024-09-15 RX ORDER — CHLORHEXIDINE GLUCONATE 40 MG/ML
1 SOLUTION TOPICAL DAILY
Refills: 0 | Status: DISCONTINUED | OUTPATIENT
Start: 2024-09-15 | End: 2024-09-17

## 2024-09-15 RX ORDER — FLU VACCINE TS 2012-2013(5YR+) 45MCG/.5ML
0.5 VIAL (ML) INTRAMUSCULAR ONCE
Refills: 0 | Status: COMPLETED | OUTPATIENT
Start: 2024-09-15 | End: 2024-09-15

## 2024-09-15 RX ADMIN — Medication 125 MILLILITER(S): at 18:33

## 2024-09-15 RX ADMIN — CHLORHEXIDINE GLUCONATE 1 APPLICATION(S): 40 SOLUTION TOPICAL at 18:02

## 2024-09-15 NOTE — OB PROVIDER H&P - HISTORY OF PRESENT ILLNESS
HPI: Pt is a 47 yo  at 39 weeks  presenting for AMA IOL.  IVF pregnancy   short long bones noted on 36 weeks sono. Per in house NICU team, no need for NICU at delivery. Peds should be called to delivery and escalate if needed.   pt has laminectomy L5-S1 and desires epidural during labor. Anesthesia notified and will come to bedside to evaluate pt  hx sickle cell trait and hemoglobin C deficiency. No hx of crisis/hospitalization   Endorses good fetal movement. Denies VB/LOF  GBS negative  EFW 2977 grams

## 2024-09-15 NOTE — OB PROVIDER H&P - ASSESSMENT
A/P: Pt is a 49 yo  at 39 weeks who presents for AMA IOL    1. Admit to LND. Routine Labs. IVF  2.  IOL via vaginal cytotec and cervical balloon when able  3. Fetus: Cat 1 tracing, Vertex, EFW 2977 grams . C/w EFM.  4. Anesthesia consult s/p laminectomy  5. peds to notified at delivery  6. GBS negative    d/w Dr Gus nolasco PA-C

## 2024-09-15 NOTE — OB RN PATIENT PROFILE - NSICDXPASTSURGICALHX_GEN_ALL_CORE_FT
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

## 2024-09-15 NOTE — OB PROVIDER H&P - NS_FORCEPSDELIVERY_OBGYN_ALL_OB_NU
0 Spironolactone Counseling: Patient advised regarding risks of diarrhea, abdominal pain, hyperkalemia, birth defects (for female patients), liver toxicity and renal toxicity. The patient may need blood work to monitor liver and kidney function and potassium levels while on therapy. The patient verbalized understanding of the proper use and possible adverse effects of spironolactone.  All of the patient's questions and concerns were addressed.

## 2024-09-15 NOTE — OB PROVIDER H&P - NS_OBGYNHISTORY_OBGYN_ALL_OB_FT
OB hx  - : /FT/#8  - : //#  - : SAB with D+C    GYN hx:  - denies fibroids, cysts, STDs, abnormal PAPs

## 2024-09-15 NOTE — OB PROVIDER IHI INDUCTION/AUGMENTATION NOTE - LABOR: CERVICAL DILATION
Less than 1 cm Composite Graft Text: The defect edges were debeveled with a #15 scalpel blade.  Given the location of the defect, shape of the defect, the proximity to free margins and the fact the defect was full thickness a composite graft was deemed most appropriate.  The defect was outline and then transferred to the donor site.  A full thickness graft was then excised from the donor site. The graft was then placed in the primary defect, oriented appropriately and then sutured into place.  The secondary defect was then repaired using a primary closure.

## 2024-09-15 NOTE — OB RN PATIENT PROFILE - NSMATERNALFETALCONCERNS_OBGYN_ALL_OB_FT
Maternal/Fetal Alert  AMA, IVF  2024 ALL LONG BONES MEASURING 4 WEEKS LAG requiring  POST RASHI FOLLOW UP /evaluation   alert NICU   Antoinette Nam RN 2024

## 2024-09-15 NOTE — OB PROVIDER H&P - ATTENDING COMMENTS
Agree with above  IOL for AMA  For vaginal cytotec, CB  Epidural PRN  Anesthesia consult   NICU consult     maureen LOPEZ

## 2024-09-15 NOTE — OB PROVIDER H&P - NSHPPHYSICALEXAM_GEN_ALL_CORE
Vital Signs Last 24 Hrs  T(C): 36.8 (15 Sep 2024 17:05), Max: 36.8 (15 Sep 2024 17:05)  T(F): 98.24 (15 Sep 2024 17:05), Max: 98.24 (15 Sep 2024 17:05)  HR: 86 (15 Sep 2024 17:45) (81 - 88)  BP: 135/81 (15 Sep 2024 17:45) (130/80 - 135/81)  BP(mean): --  RR: 17 (15 Sep 2024 17:05) (17 - 17)  SpO2: --        Physical Exam:  Gen: NAD, AOx3  CV: RR  Resp: unlabored respirations  Abd: soft, NT, ND    Speculum Exam:   - pooling, nitrazine, ferning, bleeding   - (lesions if pt has history of HSV)    SVE: 0/0/-3  FHT: 140/mod variability/+accels/no decels Cat 1  [place]: irregular   EFW: 2977 grams  Sono: fetal presentation, cephalic

## 2024-09-15 NOTE — OB RN PATIENT PROFILE - NSICDXPASTMEDICALHX_GEN_ALL_CORE_FT
PAST MEDICAL HISTORY:  Back pain     Breast asymmetry following reconstructive surgery     Ear lobe laceration left    Hemoglobin C trait Pt states she is asymptomatic and treats deficiency with daily folic acid    Sickle cell trait

## 2024-09-16 LAB
HCT VFR BLD CALC: 35.7 % — SIGNIFICANT CHANGE UP (ref 34.5–45)
HGB BLD-MCNC: 12.5 G/DL — SIGNIFICANT CHANGE UP (ref 11.5–15.5)
MCHC RBC-ENTMCNC: 24.7 PG — LOW (ref 27–34)
MCHC RBC-ENTMCNC: 35 GM/DL — SIGNIFICANT CHANGE UP (ref 32–36)
MCV RBC AUTO: 70.6 FL — LOW (ref 80–100)
NRBC # BLD: 0 /100 WBCS — SIGNIFICANT CHANGE UP (ref 0–0)
NRBC # FLD: 0 K/UL — SIGNIFICANT CHANGE UP (ref 0–0)
PLATELET # BLD AUTO: 237 K/UL — SIGNIFICANT CHANGE UP (ref 150–400)
RBC # BLD: 5.06 M/UL — SIGNIFICANT CHANGE UP (ref 3.8–5.2)
RBC # FLD: 13.7 % — SIGNIFICANT CHANGE UP (ref 10.3–14.5)
T PALLIDUM AB TITR SER: NEGATIVE — SIGNIFICANT CHANGE UP
WBC # BLD: 12.91 K/UL — HIGH (ref 3.8–10.5)
WBC # FLD AUTO: 12.91 K/UL — HIGH (ref 3.8–10.5)

## 2024-09-16 PROCEDURE — 59400 OBSTETRICAL CARE: CPT | Mod: U9,GC

## 2024-09-16 RX ORDER — GENTAMICIN 40 MG/ML
400 INJECTION, SOLUTION INTRAMUSCULAR; INTRAVENOUS ONCE
Refills: 0 | Status: COMPLETED | OUTPATIENT
Start: 2024-09-16 | End: 2024-09-16

## 2024-09-16 RX ORDER — HYDROCORTISONE 1 %
1 OINTMENT (GRAM) TOPICAL EVERY 6 HOURS
Refills: 0 | Status: DISCONTINUED | OUTPATIENT
Start: 2024-09-16 | End: 2024-09-18

## 2024-09-16 RX ORDER — VITAMIN A, ASCORBIC ACID, VITAMIN D, .ALPHA.-TOCOPHEROL, THIAMINE MONONITRATE, RIBOFLAVIN, NIACIN, PYRIDOXINE HYDROCHLORIDE, FOLIC ACID, CYANOCOBALAMIN, CALCIUM, IRON, MAGNESIUM, ZINC, AND COPPER 2700; 70; 400; 30; 1.6; 1.8; 18; 2.5; 1; 12; 100; 65; 25; 25; 2 [IU]/1; MG/1; [IU]/1; [IU]/1; MG/1; MG/1; MG/1; MG/1; MG/1; UG/1; MG/1; MG/1; MG/1; MG/1; MG/1
1 TABLET ORAL DAILY
Refills: 0 | Status: DISCONTINUED | OUTPATIENT
Start: 2024-09-16 | End: 2024-09-18

## 2024-09-16 RX ORDER — TETANUS TOXOID, REDUCED DIPHTHERIA TOXOID AND ACELLULAR PERTUSSIS VACCINE, ADSORBED 5; 2.5; 8; 8; 2.5 [IU]/.5ML; [IU]/.5ML; UG/.5ML; UG/.5ML; UG/.5ML
0.5 SUSPENSION INTRAMUSCULAR ONCE
Refills: 0 | Status: DISCONTINUED | OUTPATIENT
Start: 2024-09-16 | End: 2024-09-18

## 2024-09-16 RX ORDER — MENTHOL/CETYLPYRD CL 3 MG
1 LOZENGE MUCOUS MEMBRANE EVERY 6 HOURS
Refills: 0 | Status: DISCONTINUED | OUTPATIENT
Start: 2024-09-16 | End: 2024-09-18

## 2024-09-16 RX ORDER — CLINDAMYCIN PHOSPHATE 150 MG/ML
VIAL (ML) INJECTION
Refills: 0 | Status: DISCONTINUED | OUTPATIENT
Start: 2024-09-16 | End: 2024-09-17

## 2024-09-16 RX ORDER — OXYTOCIN 10 UNIT/ML
2 AMPUL (ML) INJECTION
Qty: 30 | Refills: 0 | Status: DISCONTINUED | OUTPATIENT
Start: 2024-09-16 | End: 2024-09-17

## 2024-09-16 RX ORDER — OXYTOCIN 10 UNIT/ML
666 AMPUL (ML) INJECTION
Qty: 30 | Refills: 0 | Status: DISCONTINUED | OUTPATIENT
Start: 2024-09-16 | End: 2024-09-18

## 2024-09-16 RX ORDER — DIPHENHYDRAMINE HCL 50 MG
25 CAPSULE ORAL EVERY 6 HOURS
Refills: 0 | Status: DISCONTINUED | OUTPATIENT
Start: 2024-09-16 | End: 2024-09-18

## 2024-09-16 RX ORDER — LANOLIN
1 OINTMENT (GRAM) TOPICAL EVERY 6 HOURS
Refills: 0 | Status: DISCONTINUED | OUTPATIENT
Start: 2024-09-16 | End: 2024-09-18

## 2024-09-16 RX ORDER — ACETAMINOPHEN 325 MG/1
975 TABLET ORAL
Refills: 0 | Status: DISCONTINUED | OUTPATIENT
Start: 2024-09-16 | End: 2024-09-18

## 2024-09-16 RX ORDER — WITCH HAZEL 1 G/ML
1 LIQUID TOPICAL EVERY 4 HOURS
Refills: 0 | Status: DISCONTINUED | OUTPATIENT
Start: 2024-09-16 | End: 2024-09-18

## 2024-09-16 RX ORDER — PRAMOXINE HCL 1 %
1 GEL (GRAM) TOPICAL EVERY 4 HOURS
Refills: 0 | Status: DISCONTINUED | OUTPATIENT
Start: 2024-09-16 | End: 2024-09-18

## 2024-09-16 RX ORDER — SODIUM CHLORIDE 9 MG/ML
3 INJECTION INTRAMUSCULAR; INTRAVENOUS; SUBCUTANEOUS EVERY 8 HOURS
Refills: 0 | Status: DISCONTINUED | OUTPATIENT
Start: 2024-09-16 | End: 2024-09-18

## 2024-09-16 RX ORDER — IBUPROFEN 600 MG
600 TABLET ORAL EVERY 6 HOURS
Refills: 0 | Status: COMPLETED | OUTPATIENT
Start: 2024-09-16 | End: 2025-08-15

## 2024-09-16 RX ORDER — CLINDAMYCIN PHOSPHATE 150 MG/ML
900 VIAL (ML) INJECTION ONCE
Refills: 0 | Status: COMPLETED | OUTPATIENT
Start: 2024-09-16 | End: 2024-09-16

## 2024-09-16 RX ORDER — CLINDAMYCIN PHOSPHATE 150 MG/ML
900 VIAL (ML) INJECTION EVERY 8 HOURS
Refills: 0 | Status: DISCONTINUED | OUTPATIENT
Start: 2024-09-16 | End: 2024-09-17

## 2024-09-16 RX ORDER — OXYCODONE HYDROCHLORIDE 5 MG/1
5 TABLET ORAL ONCE
Refills: 0 | Status: DISCONTINUED | OUTPATIENT
Start: 2024-09-16 | End: 2024-09-18

## 2024-09-16 RX ORDER — OXYCODONE HYDROCHLORIDE 5 MG/1
5 TABLET ORAL
Refills: 0 | Status: DISCONTINUED | OUTPATIENT
Start: 2024-09-16 | End: 2024-09-18

## 2024-09-16 RX ORDER — ACETAMINOPHEN 325 MG/1
1000 TABLET ORAL ONCE
Refills: 0 | Status: COMPLETED | OUTPATIENT
Start: 2024-09-16 | End: 2024-09-16

## 2024-09-16 RX ORDER — KETOROLAC TROMETHAMINE 30 MG/ML
30 INJECTION, SOLUTION INTRAMUSCULAR ONCE
Refills: 0 | Status: DISCONTINUED | OUTPATIENT
Start: 2024-09-16 | End: 2024-09-17

## 2024-09-16 RX ADMIN — GENTAMICIN 200 MILLIGRAM(S): 40 INJECTION, SOLUTION INTRAMUSCULAR; INTRAVENOUS at 15:38

## 2024-09-16 RX ADMIN — Medication 2 MILLIUNIT(S)/MIN: at 17:22

## 2024-09-16 RX ADMIN — Medication 100 MILLIGRAM(S): at 22:30

## 2024-09-16 RX ADMIN — CHLORHEXIDINE GLUCONATE 1 APPLICATION(S): 40 SOLUTION TOPICAL at 19:17

## 2024-09-16 RX ADMIN — Medication 666 MILLIUNIT(S)/MIN: at 23:24

## 2024-09-16 RX ADMIN — ACETAMINOPHEN 1000 MILLIGRAM(S): 325 TABLET ORAL at 14:27

## 2024-09-16 RX ADMIN — Medication 125 MILLILITER(S): at 07:30

## 2024-09-16 RX ADMIN — Medication 100 MILLIGRAM(S): at 14:12

## 2024-09-16 RX ADMIN — SODIUM CHLORIDE 3 MILLILITER(S): 9 INJECTION INTRAMUSCULAR; INTRAVENOUS; SUBCUTANEOUS at 23:24

## 2024-09-16 RX ADMIN — ACETAMINOPHEN 400 MILLIGRAM(S): 325 TABLET ORAL at 13:42

## 2024-09-16 NOTE — OB NEONATOLOGY/PEDIATRICIAN DELIVERY SUMMARY - BABY A: APGAR 5 MIN MUSCLE TONE, DELIVERY
OPERATIVE REPORT  PATIENT NAME: Lachelle Richardson    :  1950  MRN: [id number]  Pt Location:  OR ROOM 02    SURGERY DATE: 2021    Surgeon(s) and Role:     Neftaly Fan MD - Primary     * Shlomo Soler PA-C - Assisting     * Denisse Mallory DPM - Assisting    Preop Diagnosis:  Post-traumatic arthritis of ankle, right [M19 171]    Post-Op Diagnosis Codes:     * Post-traumatic arthritis of ankle, right [M19 171]    Procedure(s) (LRB):  ARTHROPLASTY ANKLE (Right)    Specimen(s):  * No specimens in log *    Estimated Blood Loss:   Minimal    Drains:  * No LDAs found *    Anesthesia Type:   Choice    Operative Indications:  Post-traumatic arthritis of ankle, right [M19 171]      Operative Findings:  Consisnent with diagnosis    Complications:   None    Procedure and Technique:  1  [place] total ankle replacement  2  Medial release of the deltoid ligament  3  Prophylactic fixation of impending medial malleolus fracture    Surgical preparation  After informed consent and preoperative medical clearance were obtained, the patient was taken to the preop holding area, where a regional block was performed  Patient was given appropriate perioperative IV antibiotics, taken to the operating room, and placed supine on the operating table  Further anesthesia was administered without complication (see anesthesia report)  The operative extremity was prepped and draped in sterile fashion  The operative lower extremity was exsanguinated with an elastic bandage and a proximally applied thigh tourniquet was inflated  Timeout was performed with the attending surgeon in the room  Approach through placement of trial implants  A longitudinal incision was made on the anterior aspect of the ankle and careful soft tissue dissection was maintained  The superficial peroneal nerve was protected throughout the procedure   The extensor retinaculum was divided longitudinally, and the deep neurovascular bundle and extensor tendons were protected throughout the procedure  The anterior capsule was divided and reflected  The joint was exposed  There was extensive arthritis throughout the ankle  Osteophytes were removed from the anterior ankle  The ankle was mobilized  The osteophytes were removed medially and laterally, and soft tissue was cleared from the medial and lateral gutters  We used a 15 blade to release the deltoid ligament from the tibia to correct the hindfoot varus  We were able to reduce the ankle into a better position  A stab incision was made in the proximal tibia, and from this, an external tibial alignment guide was suspended  We adjusted the proper position for the guide while the and pinned it in place  Rotation was carefully controlled based on the axis of rotation in the sagittal plane of the talus  The resection level was then properly set, and based on intraoperative fluoroscopy, this was confirmed  The tibial cutting block was then positioned and properly pinned at the optimal resection level and confirmed fluoroscopically  There was no violation of the medial malleolus suggested with the pinning of the tibial capture guide  Two pins were placed and the medial and lateral aspects of the tibial plafond were then weakened w/ the dedicated drill pin  With the soft tissues well protected, the oscillating saw was then utilized carefully to perform the tibial preparation  The reciprocating saw was used to make the vertical cut at the medial plafond  The capture guide was removed and the resected bone was evacuated from the joint  With the ankle in appropriate neutral dorsiflexion position, and the hindfoot in physiologic valgus, the talar resection guide was pinned in proper position - this guide's position was confirmed on intraop fluoro interpretation  Using the oscillating saw and protecting the medial and lateral components and soft tissues, the talar bone/dome was ressected    The external tibial alignment guide was then removed and the resected talar bone was evacuated from the joint  Thorough irrigation was performed with copious amounts of sterile saline mixed with antibiotics  The talar sizing guide was then placed and pinned - proper position confirmed fluoroscopically  Chamfer cutting guide placed  Chamfer cuts and milled surface prepared  Guide removed, resected bone evacuated  Rasp used to prepared talar dome  Trial talus placed and proper position confirmed clinically and fluoroscopically  Peg holes for the talus were reamed  Trial tibia was placed then a trial poly placed - appropriate thickness of the poly was confirmed on clinical exam   Intraop fluoro confirmed proper trial component position  Some of the bone was resected from the posteromedial fibula and posterolateral medial malleolus in order to ensure that there was appropriate position for the tibial trial  The appropriately sized tibial trial was utilized with the polyethylene that provided optimal stability and there was 5 degrees of DF beyond neutral        Placement of final implant through closure  Intraoperative fluoroscopy confirmed appropriate position for the components, and no evidence for stress fracture  The tibial trial was thus pinned in place, and there was no evidence for posterior liftoff on intraoperative fluoroscopy and no suggestion of stress fracture  Trial talus and trial poly removed  Tibia prepared with punches and tibial trial removed  Thorough irrigation was performed with copious amounts of sterile saline mixed with antibiotics  The tibial component was placed and properly impacted - proper position was confirmed on intraop fluoro  The talar was impacted and its proper position confirmed on intraop fluoro  Trial poly placed  The ankle could be dorsiflexed to 20 neutral, to 30 degrees and was stable to varus - valgus stress  The poly was inserted and locking clip used to lock poly in position  Intraop fluoroscopy suggested no posterior lift off of the tibial component  Thorough irrigation was again performed with copious amounts of sterile saline mixed w/ abx  Intraoperative fluoroscopy confirmed appropriate position of the components, both in the coronal and sagittal planes, including no posterior liftoff  There was no suggestion of stress fracture  The ankle was noted to be stable to varus/valgus stress and had satisfactory dorsiflexion  The medial malleolus was thin, even with a size 1 tibia in place  We therefore drilled and placed a prophylactic screw to prevent the impending medial malleolus or stress fracture in the tibia  Vancomycin powder was placed into the wound bed  The subcutaneous layer was then thoroughly irrigated with copious amounts of sterile saline  The superficial peroneal nerve was protected throughout the procedure  The subcutaneous layer was then reapproximated using vicryl suture in interrupted fashion, and the skin was reapproximated using nylon suture for the anterior wound w/ interrupted fashion for anterior wound and a tensionless closure  The pin in the proximal tibia was removed, and this wound was irrigated and closed with nylon suture  Sterile dressings were applied and with abundant padding with the ankle in neutral position, a shortleg splint was applied  Satisfactory capillary refill remained in all toes  The patient tolerated the procedure well and was awakened from anesthesia without complication  He was taken to the recovery room in stable condition        I was present for the entire procedure, A qualified resident physician was not available and A physician assistant was required during the procedure for retraction tissue handling,dissection and suturing    Patient Disposition:  PACU     SIGNATURE: Guevara Cantu MD  DATE: September 13, 2021  TIME: 1:33 PM (2) well flexed

## 2024-09-16 NOTE — CHART NOTE - NSCHARTNOTEFT_GEN_A_CORE
OBGYN  Note    PTA  PTA was done with the team due to  Indication:  Chorioamnionitis   Plan:  Continue q 8 hr Antibiotics, Antipyretic as needed  AROM (?light mec)  Monitor closely    Britni Rizvi MD
Pt pushing with good effort  EFM 200s, ISE placed- occasional variable decel, minimal to moderate variability  toco q2-3 min  VE FD/+1 with descent noted; OP position  Category 2, second stage  PTA held  will attempt VAVD with consent and once further descent occurs  Lateral positioning

## 2024-09-16 NOTE — OB PROVIDER LABOR PROGRESS NOTE - NS_OBIHIFHRDETAILS_OBGYN_ALL_OB_FT
150/mod/+accels/-decels
165bpm moderate variability, accels, nonreccurent mild variable decels
140/mod/+accels/-decels
175/mod/-accels/-decels

## 2024-09-16 NOTE — OB PROVIDER LABOR PROGRESS NOTE - NS_SUBJECTIVE/OBJECTIVE_OBGYN_ALL_OB_FT
Pt seen for placement of vaginal cytotec #1
pt seen and examined to assess for cervical change, s/p spontaneous expulsion of cervical morton balloon. Pt comfortable w/ epidural
R3 Labor Note    S: Patient evaluated at bedside for cervical change. Reports more pressure.    O:  T(C): 36.9 (09-16-24 @ 18:28), Max: 38.1 (09-16-24 @ 13:18)  HR: 98 (09-16-24 @ 19:32) (66 - 131)  BP: 149/78 (09-16-24 @ 19:17) (111/66 - 153/87)  RR: 18 (09-16-24 @ 18:28) (12 - 18)  SpO2: 95% (09-16-24 @ 19:32) (85% - 97%)
R3 Labor Note    S: Patient evaluated at bedside for placement of CB. CB placed with 60cc/60cc NS. Pt comfortable with epidural.    O:  T(C): 36.4 (09-15-24 @ 23:30), Max: 36.8 (09-15-24 @ 17:05)  HR: 66 (09-15-24 @ 23:47) (66 - 96)  BP: 131/79 (09-15-24 @ 23:47) (129/75 - 147/77)  RR: 14 (09-15-24 @ 23:30) (14 - 17)  SpO2: 94% (09-15-24 @ 23:47) (87% - 97%)

## 2024-09-16 NOTE — OB RN DELIVERY SUMMARY - NSSELHIDDEN_OBGYN_ALL_OB_FT
[NS_DeliveryAttending1_OBGYN_ALL_OB_FT:[id number]==],[NS_DeliveryRN_OBGYN_ALL_OB_FT:[id number]=]

## 2024-09-16 NOTE — OB PROVIDER LABOR PROGRESS NOTE - ASSESSMENT
49 y/o  at 39.1 weeks gestation admitted for AMA risk reducing induction of labor, c/b gHTN    -Pt w/ good cervical change, with bulging bag  -s/p buccal cytotec. Start pitocin to continue induction when able      d/w Dr. Ran Bull [facility]
A/P 48y  @39w IOL  -IOL: kraig too much for VC, CB placed at 1140p, c/w VC when able  -Cat 1 tracing  -GBS neg  -EFW 2977  -Labile BPs - no SRBPs, denies sxs of PEC, HELLP labs wnl, P/C 0.1, continue to monitor  -Analgesia epi  -Anticipate     Plan per Dr. Gus Conti, PGY-3
Vcyto #1 placed in posterior fornix     Of note, pt noted to have clitoral piercing - pt reports this cannot be removed  Discussed that in case of need for  delivery - the use of Bovie cautery can rarely conduct through the piercing and cause burns. Pt expresses understanding     maureen LOPEZ
A/P 48y  @39w1d IOL for AMA c/b gHTN and chorioamnionitis  -IOL: c/w Pitocin  -Cat 2 tracing with fetal tachycardia i/s/o chorioamnionitis. Overall reassuring with moderate variability, c/w resuscitation and anticipate  soon  -gHTN - no SRBPs, denies sxs of PEC, HELLP labs wnl, P/C 0.1, continue to monitor  -chorio - T38.1 @130p, c/w gent/clinda i/s/o PCN allergy  -GBS neg  -EFW 2977  -Analgesia epi  -Anticipate     d/w Dr. Tong, en route to [facility], PGY-3

## 2024-09-16 NOTE — OB RN DELIVERY SUMMARY - NS_SEPSISRSKCALC_OBGYN_ALL_OB_FT
EOS calculated successfully. EOS Risk Factor: 0.5/1000 live births ([facility] national incidence); GA=39w1d; Temp=100.58; ROM=3.1; GBS='Negative'; Antibiotics='No antibiotics or any antibiotics < 2 hrs prior to birth'

## 2024-09-16 NOTE — OB NEONATOLOGY/PEDIATRICIAN DELIVERY SUMMARY - NSPEDSNEONOTESA_OBGYN_ALL_OB_FT
Baby is a 39.1 wk GA female born to a 49y/o  mother via . PEDS and NICU called to delivery for chorioamnionitis. Maternal history includes sickle cell trait, HbC deficiency. Pregnancy uncomplicated . Maternal blood type O+. PNL HIV negative, HepB negative, RPR non-reactive, and Rubella immune. GBS negative. ROM at 18:14 on  clear fluids. Delivery uncomplicated. Baby born vigorous and crying spontaneously. Warmed, dried, suctioned and stimulated. Required deep suctioning and chest PT for suprasternal retractions. Apgars 8/8. EOS 0.51. Mom plans to breastfeed and consents hepB.   BW:   : 24  TOB:    Physical Exam:  Gen: NAD, +grimace  HEENT: anterior fontanel open soft and flat, +molding, ears normal set, no ear pits or tags  Resp: subcostal and supratracheal retractions resolved with suctioning  Abd: soft,  umbilical cord with 3 vessels  Back: spine midline, no sacral dimple or tuft of hair  Neuro: +raudel plantar, normal tone  Extremities:  moving all extremities, full range of motion x 4, no crepitus  Skin: pink, warm, acrocyanosis  Genitals: Normal female anatomy, Marcin 1, anus patent Baby is a 39.1 wk SGA female born to a 47y/o  mother via . PEDS and NICU called to delivery for chorioamnionitis, cat 2 tracing,. Maternal history includes sickle cell trait, HbC deficiency, AMA on aspirin. Pregnancy uncomplicated . Maternal blood type O+. PNL HIV negative, HepB negative, RPR non-reactive, and Rubella immune. GBS negative. ROM at 18:14 on  clear fluids (3h rom). Delivery uncomplicated. Baby born vigorous and crying spontaneously. Warmed, dried, suctioned and stimulated. Required deep suctioning and chest PT for suprasternal retractions. Apgars 8/8. EOS 0.51. Mom plans to breastfeed and consents hepB.   BW: 2591  : 24  TOB: 21:20    Physical Exam:  Gen: NAD, +grimace  HEENT: anterior fontanel open soft and flat, +molding, ears normal set, no ear pits or tags  Resp: subcostal and supratracheal retractions resolved with suctioning  Abd: soft,  umbilical cord with 3 vessels  Back: spine midline, no sacral dimple or tuft of hair  Neuro: +arudel plantar, normal tone  Extremities:  moving all extremities, full range of motion x 4, no crepitus  Skin: pink, warm, acrocyanosis  Genitals: Normal female anatomy, Marcin 1, anus patent

## 2024-09-16 NOTE — OB NEONATOLOGY/PEDIATRICIAN DELIVERY SUMMARY - NS_RESUSCITEFFORT_OBGYN_ALL_OB
Anticoagulation Progress Note    Indication: Bilateral DVT (09/01/16) RLE DVT (3/11/23)  Goal INR: 2.0-3.0  Duration: long-term  Referring Provider: Kory Gomes DO  Supervising Provider: Kory Gomes DO  Order Expiration Date: 12/06/23    Pertinent History:    03/11/23:Acute deep vein thrombosis (DVT) of femoral vein of right lower extremity 09/01/16 - Bilateral LE Doppler - RLE showed nonocclusive echogenic deep venous thrombus in the Rpopliteal vein, possibly chronic - LLE showed occlusive expansile acute appearing deep venous thrombus in the left popliteal vein    High-risk maintenance medications:  DOAC Eval: Spoke to patient regarding Eliquis card that he was given be Dr gomes at last visit. He stated he did not look into it and wishes to remain on warfarin at this time.    Assessment  Yes No   []  [x] Missed doses:    []  [x] Extra doses:    []  [x] Significant medication changes (Rx, OTC, Herbal):    []  [x] Vitamin K / dietary changes (Vit K goal - typically avoids):    []  [x] Bleeding / bruising:   []  [x] Falls / injury:   []  [x] Acute illness:    []  [x] Alcohol intake: Avoids   [x]  [] Procedures / hospitalization / ER visits: 04/03 - pt rescheduled for colonoscopy on 05/02/23. Pt will be required to interrupt warfarin x5days with LMWH bridge as pt recently dx with acute RLE DVT on 03/11/23. 03/24-Pt reports he held warfarin 5 days (02/26-03/02) then colonoscopy was cancelled due to pt not receiving prep from pharmacy. ED with RLE DVT 3/11-INR 1.7  Patient states no plan to reschedule colonoscopy at this time   []  [x] Other:    Plan (5mg tablets)  INR Result: 1.9  The INR result for today is subtherapeutic based on the INR goal 2.0-3.0.    Recommended Dose: 15mgx1(Fri); then continue present management 12.5mgTuSa;10mgROW (75mg/wk) Etiology:   Follow-Up: 6 days - 05/18/23 - venous draw at [facility]  Comments: 05/12 -Pt refused lab draw Monday as suggested. He states he cant get to [place]  until Thursday 5/18.    Counseling  Counseled about signs/symptoms of bruising/bleeding and appropriate management  Counseled about signs/symptoms of thrombosis and/or stroke and when to seek emergent care  Stressed importance of compliance with exact recommended dosage regimen  Stressed importance of adherence with recommended lab monitoring  Instructed to notify clinic about medication changes or health status changes  Instructed to notify clinic about scheduled procedures  Discussed risk of thrombosis and/or bleeding with inappropriate anticoagulant use and monitoring frequency    Provided patient/caregiver with written and verbal dosing instructions, patient/caregiver verbalized understanding.           Bulb Lisa-Pharynx Suction with additional procedures

## 2024-09-17 ENCOUNTER — TRANSCRIPTION ENCOUNTER (OUTPATIENT)
Age: 49
End: 2024-09-17

## 2024-09-17 RX ORDER — IBUPROFEN 600 MG
600 TABLET ORAL EVERY 6 HOURS
Refills: 0 | Status: DISCONTINUED | OUTPATIENT
Start: 2024-09-17 | End: 2024-09-18

## 2024-09-17 RX ORDER — IBUPROFEN 600 MG
1 TABLET ORAL
Qty: 0 | Refills: 0 | DISCHARGE
Start: 2024-09-17

## 2024-09-17 RX ADMIN — Medication 600 MILLIGRAM(S): at 13:00

## 2024-09-17 RX ADMIN — Medication 600 MILLIGRAM(S): at 18:07

## 2024-09-17 RX ADMIN — KETOROLAC TROMETHAMINE 30 MILLIGRAM(S): 30 INJECTION, SOLUTION INTRAMUSCULAR at 00:15

## 2024-09-17 RX ADMIN — ACETAMINOPHEN 975 MILLIGRAM(S): 325 TABLET ORAL at 03:20

## 2024-09-17 RX ADMIN — Medication 600 MILLIGRAM(S): at 06:45

## 2024-09-17 RX ADMIN — Medication 600 MILLIGRAM(S): at 05:49

## 2024-09-17 RX ADMIN — SODIUM CHLORIDE 3 MILLILITER(S): 9 INJECTION INTRAMUSCULAR; INTRAVENOUS; SUBCUTANEOUS at 21:00

## 2024-09-17 RX ADMIN — Medication 100 MILLIGRAM(S): at 14:21

## 2024-09-17 RX ADMIN — Medication 600 MILLIGRAM(S): at 12:03

## 2024-09-17 RX ADMIN — ACETAMINOPHEN 975 MILLIGRAM(S): 325 TABLET ORAL at 10:40

## 2024-09-17 RX ADMIN — ACETAMINOPHEN 975 MILLIGRAM(S): 325 TABLET ORAL at 09:42

## 2024-09-17 RX ADMIN — VITAMIN A, ASCORBIC ACID, VITAMIN D, .ALPHA.-TOCOPHEROL, THIAMINE MONONITRATE, RIBOFLAVIN, NIACIN, PYRIDOXINE HYDROCHLORIDE, FOLIC ACID, CYANOCOBALAMIN, CALCIUM, IRON, MAGNESIUM, ZINC, AND COPPER 1 TABLET(S): 2700; 70; 400; 30; 1.6; 1.8; 18; 2.5; 1; 12; 100; 65; 25; 25; 2 TABLET ORAL at 18:07

## 2024-09-17 RX ADMIN — Medication 100 MILLIGRAM(S): at 05:49

## 2024-09-17 RX ADMIN — SODIUM CHLORIDE 3 MILLILITER(S): 9 INJECTION INTRAMUSCULAR; INTRAVENOUS; SUBCUTANEOUS at 06:45

## 2024-09-17 RX ADMIN — Medication 600 MILLIGRAM(S): at 18:47

## 2024-09-17 RX ADMIN — ACETAMINOPHEN 975 MILLIGRAM(S): 325 TABLET ORAL at 03:50

## 2024-09-17 NOTE — DISCHARGE NOTE OB - MEDICATION SUMMARY - MEDICATIONS TO STOP TAKING
I will STOP taking the medications listed below when I get home from the hospital:    cephalexin 500 mg oral tablet  -- 1 tab(s) by mouth every 6 hours    Macrobid 100 mg oral capsule  -- 1 cap(s) by mouth 2 times a day    famotidine 20 mg oral tablet  --  by mouth twice as per pre-op instructions    folic acid 1 mg oral tablet  -- 1 tab(s) by mouth once a day in am    morphine 15 mg oral tablet  -- 1 tab(s) by mouth every 4 hours, As needed, Severe Pain (7 - 10) MDD:6    cyclobenzaprine 10 mg oral tablet  -- 1 tab(s) by mouth 3 times a day MDD:3    senna oral tablet  -- 2 tab(s) by mouth once a day (at bedtime)    Percocet 5/325 325 mg-5 mg oral tablet  -- 1-2 tab(s) by mouth every 4 hours, As Needed MDD:6 - for moderate pain  -- Caution federal law prohibits the transfer of this drug to any person other  than the person for whom it was prescribed.  May cause drowsiness.  Alcohol may intensify this effect.  Use care when operating dangerous machinery.  This prescription cannot be refilled.  This product contains acetaminophen.  Do not use  with any other product containing acetaminophen to prevent possible liver damage.  Using more of this medication than prescribed may cause serious breathing problems.    melatonin 3 mg oral tablet  -- 1 tab(s) by mouth once a day (at bedtime)    polyethylene glycol 3350 oral powder for reconstitution  -- 17 gram(s) by mouth once a day   I will STOP taking the medications listed below when I get home from the hospital:    cephalexin 500 mg oral tablet  -- 1 tab(s) by mouth every 6 hours    Macrobid 100 mg oral capsule  -- 1 cap(s) by mouth 2 times a day    famotidine 20 mg oral tablet  --  by mouth twice as per pre-op instructions    morphine 15 mg oral tablet  -- 1 tab(s) by mouth every 4 hours, As needed, Severe Pain (7 - 10) MDD:6    cyclobenzaprine 10 mg oral tablet  -- 1 tab(s) by mouth 3 times a day MDD:3    Percocet 5/325 325 mg-5 mg oral tablet  -- 1-2 tab(s) by mouth every 4 hours, As Needed MDD:6 - for moderate pain  -- Caution federal law prohibits the transfer of this drug to any person other  than the person for whom it was prescribed.  May cause drowsiness.  Alcohol may intensify this effect.  Use care when operating dangerous machinery.  This prescription cannot be refilled.  This product contains acetaminophen.  Do not use  with any other product containing acetaminophen to prevent possible liver damage.  Using more of this medication than prescribed may cause serious breathing problems.    Rolling Walker  -- S/P L5-S1 PLIF w/ICBG

## 2024-09-17 NOTE — DISCHARGE NOTE OB - HOSPITAL COURSE
Pt was admitted for induction of labor for AMA.  She developed chorioamnionitis for which she was treated.  She also met criteria for gestational HTN>  She delivered a viable female infant via .  Uncomplicated PP care

## 2024-09-17 NOTE — LACTATION INITIAL EVALUATION - LACTATION INTERVENTIONS
Primary RN made aware of consult and plan./initiate/review safe skin-to-skin/initiate/review hand expression/initiate/review techniques for position and latch/post discharge community resources provided/review techniques to increase milk supply/review techniques to manage sore nipples/engorgement/initiate/review breast massage/compression/reviewed components of an effective feeding and at least 8 effective feedings per day required/reviewed importance of monitoring infant diapers, the breastfeeding log, and minimum output each day/reviewed risks of artificial nipples/reviewed benefits and recommendations for rooming in/reviewed feeding on demand/by cue at least 8 times a day

## 2024-09-17 NOTE — DISCHARGE NOTE OB - PLAN OF CARE
Blood pressure control Nothing per vagina for 6wks Follow-up in your OB office within 1 week for a blood pressure check.   Please take your blood pressure 3x/day. Call your doctor if your blood pressure is 140 (top number) OR 90 (bottom number) or higher. Return to hospital if your blood pressure is 160 (top number)  (bottom number) or higher. Call your doctor if you experience symptoms such as headache, blurry vision, epigastric pain, or nausea/vomiting. After discharge, please stay on pelvic rest for 6 weeks, meaning no sexual intercourse, no tampons and no douching.  No driving for 2 weeks.  No lifting objects heavier than baby for 2 weeks.  Expect to have vaginal bleeding/spotting for up to six weeks.  The bleeding should get lighter and more white/light brown with time.  For bleeding soaking more than a pad an hour or passing clots greater than the size of your fist, come in to the   emergency department.  Follow up in the office in 6 weeks

## 2024-09-17 NOTE — DISCHARGE NOTE OB - MATERIALS PROVIDED
[facility] [place] Screening Program/  Immunization Record/Breastfeeding Log/Guide to Postpartum Care/[facility] Hearing Screen Program/Shaken Baby Prevention Handout/Birth Certificate Instructions/Tdap Vaccination (VIS Pub Date: 2012)

## 2024-09-17 NOTE — OB PROVIDER DELIVERY SUMMARY - NSSELHIDDEN_OBGYN_ALL_OB_FT
[NS_DeliveryAttending1_OBGYN_ALL_OB_FT:[id number]==],[NS_DeliveryRN_OBGYN_ALL_OB_FT:[id number]]

## 2024-09-17 NOTE — OB PROVIDER DELIVERY SUMMARY - AS DELIV COMPLICATIONS OB
[de-identified] : Entered by Reshma Rutherford acting as scribe.
abnormal fetal heart rate tracing/chorioamnionitis/nuchal cord

## 2024-09-17 NOTE — PROGRESS NOTE ADULT - SUBJECTIVE AND OBJECTIVE BOX
S: Patient doing well. Minimal lochia. Pain controlled.    O: Vital Signs Last 24 Hrs  T(C): 36.4 (17 Sep 2024 14:00), Max: 37.8 (16 Sep 2024 16:36)  T(F): 97.6 (17 Sep 2024 14:00), Max: 100.04 (16 Sep 2024 16:36)  HR: 91 (17 Sep 2024 14:00) (79 - 168)  BP: 123/66 (17 Sep 2024 14:00) (119/76 - 153/83)  BP(mean): --  RR: 18 (17 Sep 2024 14:00) (18 - 18)  SpO2: 100% (17 Sep 2024 14:00) (80% - 100%)    Parameters below as of 17 Sep 2024 14:00  Patient On (Oxygen Delivery Method): room air        Gen: NAD  Abd: soft, NT, ND, fundus firm below umbilicus  Lochia: moderate  Ext: no tenderness    Labs:                        12.5   12.91 )-----------( 237      ( 16 Sep 2024 17:09 )             35.7       A: 48y PPD# 1 s/p  doing well.    Plan:finishing antibiotics and BPs overall have been stable

## 2024-09-17 NOTE — DISCHARGE NOTE OB - PATIENT PORTAL LINK FT
You can access the FollowMyHealth Patient Portal offered by [facility] by registering at the following website: [email or web address]. By joining [employer]’s FollowMyHealth portal, you will also be able to view your health information using other applications (apps) compatible with our system.

## 2024-09-17 NOTE — DISCHARGE NOTE OB - CARE PROVIDER_API CALL
Alessandra Tong  Obstetrics and Gynecology  [address] [place] [address]  Phone: [phone]  Fax: [phone]  Follow Up Time:

## 2024-09-17 NOTE — LACTATION INITIAL EVALUATION - NS_GESTAGE_OBGYN_ALL_OB_FT
39w
I personally performed the services described in the documentation, reviewed the documentation recorded by the scribe in my presence and it accurately and completely records my words and action.

## 2024-09-17 NOTE — DISCHARGE NOTE OB - MEDICATION SUMMARY - MEDICATIONS TO TAKE
I will START or STAY ON the medications listed below when I get home from the hospital:    ibuprofen 600 mg oral tablet  -- 1 tab(s) by mouth every 6 hours  -- Indication: For Vaginal delivery   I will START or STAY ON the medications listed below when I get home from the hospital:    ibuprofen 600 mg oral tablet  -- 1 tab(s) by mouth every 6 hours as needed for  moderate pain  -- Indication: For moderate pain    acetaminophen 325 mg oral tablet  -- 3 tab(s) by mouth every 6 hours as needed for  mild pain  -- Indication: For mild pain    Prenatal Multivitamins with Folic Acid 1 mg oral tablet  -- 1 tab(s) by mouth once a day  -- Indication: For vitamins

## 2024-09-17 NOTE — DISCHARGE NOTE OB - CARE PLAN
Principal Discharge DX:	Normal vaginal delivery  Assessment and plan of treatment:	Nothing per vagina for 6wks  Secondary Diagnosis:	Gestational hypertension, third trimester  Assessment and plan of treatment:	Blood pressure control   1 Principal Discharge DX:	Normal vaginal delivery  Assessment and plan of treatment:	After discharge, please stay on pelvic rest for 6 weeks, meaning no sexual intercourse, no tampons and no douching.  No driving for 2 weeks.  No lifting objects heavier than baby for 2 weeks.  Expect to have vaginal bleeding/spotting for up to six weeks.  The bleeding should get lighter and more white/light brown with time.  For bleeding soaking more than a pad an hour or passing clots greater than the size of your fist, come in to the   emergency department.  Follow up in the office in 6 weeks  Secondary Diagnosis:	Gestational hypertension, third trimester  Assessment and plan of treatment:	Follow-up in your OB office within 1 week for a blood pressure check.   Please take your blood pressure 3x/day. Call your doctor if your blood pressure is 140 (top number) OR 90 (bottom number) or higher. Return to hospital if your blood pressure is 160 (top number)  (bottom number) or higher. Call your doctor if you experience symptoms such as headache, blurry vision, epigastric pain, or nausea/vomiting.

## 2024-09-18 ENCOUNTER — NON-APPOINTMENT (OUTPATIENT)
Age: 49
End: 2024-09-18

## 2024-09-18 VITALS
DIASTOLIC BLOOD PRESSURE: 85 MMHG | HEART RATE: 98 BPM | SYSTOLIC BLOOD PRESSURE: 121 MMHG | TEMPERATURE: 99 F | RESPIRATION RATE: 16 BRPM | OXYGEN SATURATION: 99 %

## 2024-09-18 RX ORDER — ACETAMINOPHEN 325 MG/1
3 TABLET ORAL
Qty: 0 | Refills: 0 | DISCHARGE
Start: 2024-09-18

## 2024-09-18 RX ORDER — VITAMIN A, ASCORBIC ACID, VITAMIN D, .ALPHA.-TOCOPHEROL, THIAMINE MONONITRATE, RIBOFLAVIN, NIACIN, PYRIDOXINE HYDROCHLORIDE, FOLIC ACID, CYANOCOBALAMIN, CALCIUM, IRON, MAGNESIUM, ZINC, AND COPPER 2700; 70; 400; 30; 1.6; 1.8; 18; 2.5; 1; 12; 100; 65; 25; 25; 2 [IU]/1; MG/1; [IU]/1; [IU]/1; MG/1; MG/1; MG/1; MG/1; MG/1; UG/1; MG/1; MG/1; MG/1; MG/1; MG/1
1 TABLET ORAL
Qty: 0 | Refills: 0 | DISCHARGE
Start: 2024-09-18

## 2024-09-18 RX ADMIN — Medication 600 MILLIGRAM(S): at 00:15

## 2024-09-18 RX ADMIN — Medication 600 MILLIGRAM(S): at 00:45

## 2024-09-18 NOTE — PROGRESS NOTE ADULT - SUBJECTIVE AND OBJECTIVE BOX
S: 47yo PPD#2 s/p , c/b gHTN and chorioamnionitits. Patient feels well. Pain is well controlled. She is tolerating a regular diet and passing flatus. She is voiding spontaneously, and ambulating without difficulty. Denies headache, visual disturbances, epigastric pain, RUQ pain, CP, SOB, lightheadedness, dizziness, N/V.    O:  Vital Signs Last 24 Hrs  T(C): 37.1 (18 Sep 2024 09:53), Max: 37.1 (18 Sep 2024 09:53)  T(F): 98.7 (18 Sep 2024 09:53), Max: 98.7 (18 Sep 2024 09:53)  HR: 83 (18 Sep 2024 09:53) (80 - 95)  BP: 136/72 (18 Sep 2024 09:53) (120/74 - 137/86)  RR: 18 (18 Sep 2024 09:53) (18 - 18)  SpO2: 99% (18 Sep 2024 09:53) (97% - 100%)    Parameters below as of 18 Sep 2024 05:43  Patient On (Oxygen Delivery Method): room air      MEDICATIONS  (STANDING):  acetaminophen     Tablet .. 975 milliGRAM(s) Oral <User Schedule>  diphtheria/tetanus/pertussis (acellular) Vaccine (Adacel) 0.5 milliLiter(s) IntraMuscular once  ibuprofen  Tablet. 600 milliGRAM(s) Oral every 6 hours  lactated ringers Bolus 500 milliLiter(s) IV Bolus once  oxytocin Infusion 666 milliUNIT(s)/Min (666 mL/Hr) IV Continuous <Continuous>  prenatal multivitamin 1 Tablet(s) Oral daily  sodium chloride 0.9% lock flush 3 milliLiter(s) IV Push every 8 hours    MEDICATIONS  (PRN):  benzocaine 20%/menthol 0.5% Spray 1 Spray(s) Topical every 6 hours PRN for Perineal discomfort  dibucaine 1% Ointment 1 Application(s) Topical every 6 hours PRN Perineal discomfort  diphenhydrAMINE 25 milliGRAM(s) Oral every 6 hours PRN Pruritus  hydrocortisone 1% Cream 1 Application(s) Topical every 6 hours PRN Moderate Pain (4-6)  lanolin Ointment 1 Application(s) Topical every 6 hours PRN nipple soreness  magnesium hydroxide Suspension 30 milliLiter(s) Oral two times a day PRN Constipation  oxyCODONE    IR 5 milliGRAM(s) Oral once PRN Moderate to Severe Pain (4-10)  oxyCODONE    IR 5 milliGRAM(s) Oral every 3 hours PRN Moderate to Severe Pain (4-10)  pramoxine 1%/zinc 5% Cream 1 Application(s) Topical every 4 hours PRN Moderate Pain (4-6)  simethicone 80 milliGRAM(s) Chew every 4 hours PRN Gas  witch hazel Pads 1 Application(s) Topical every 4 hours PRN Perineal discomfort      Labs:  Blood type: O Positive  Rubella IgG: RPR: Negative                          12.5   12.91[H] >-----------< 237    (  @ 17:09 )             35.7                        11.9   10.38 >-----------< 249    ( 09-15 @ 22:27 )             33.8[L]                        12.5   10.77[H] >-----------< 249    ( 09-15 @ 18:10 )             34.6    09-15-24 @ 22:27      137  |  103  |  11  ----------------------------<  78  4.4   |  20[L]  |  0.60        Ca    9.5      15 Sep 2024 22:27    TPro  6.7  /  Alb  3.5  /  TBili  <0.2  /  DBili  x   /  AST  18  /  ALT  11  /  AlkPhos  191[H]  09-15-24 @ 22:27      Physical Exam:  General: NAD  Breasts: soft, nontender, non-engorged, b/l nipples intact  Abdomen: soft, non-tender, non-distended, fundus firm  Vaginal: Lochia light  Lower Extremities: No edema, erythema, or calf tenderness    A/P: 47yo PPD#2 s/p , c/b gHTN and chorioamnionitis. Patient is stable and doing well post-partum.      #gHTN  -BPs well controlled overnight   -Asymptomatic  -HELLP labs wnl, P/C 0.1 (9/15)  -Patient has electronic BP monitor at home. Instructions on BP monitoring, BP parameters, and signs/symptoms of PEC reviewed with patient who verbalizes understanding.  -F/u in office within 1 week for BP check    #Chorioamnionitis  -s/p Clindamycin and Gentamicin  -Afebrile during postpartum course  -WBC 10.77->10.38->12.91    #Postpartum  - Pain well controlled, continue current pain regimen  - Increase ambulation  - Continue regular diet  - Discharge planning for today    Jany KELLEY-BC

## 2024-10-01 ENCOUNTER — TRANSCRIPTION ENCOUNTER (OUTPATIENT)
Age: 49
End: 2024-10-01

## 2024-11-13 ENCOUNTER — APPOINTMENT (OUTPATIENT)
Dept: OBGYN | Facility: CLINIC | Age: 49
End: 2024-11-13

## 2024-11-13 VITALS
SYSTOLIC BLOOD PRESSURE: 122 MMHG | DIASTOLIC BLOOD PRESSURE: 79 MMHG | HEIGHT: 64 IN | BODY MASS INDEX: 26.29 KG/M2 | HEART RATE: 76 BPM | WEIGHT: 154 LBS

## 2024-11-13 DIAGNOSIS — Z00.00 ENCOUNTER FOR GENERAL ADULT MEDICAL EXAMINATION W/OUT ABNORMAL FINDINGS: ICD-10-CM

## 2024-11-13 DIAGNOSIS — R92.30 DENSE BREASTS, UNSPECIFIED: ICD-10-CM

## 2024-11-13 PROCEDURE — 0503F POSTPARTUM CARE VISIT: CPT

## 2024-11-20 ENCOUNTER — TRANSCRIPTION ENCOUNTER (OUTPATIENT)
Age: 49
End: 2024-11-20

## 2025-09-11 ENCOUNTER — TRANSCRIPTION ENCOUNTER (OUTPATIENT)
Age: 50
End: 2025-09-11